# Patient Record
Sex: FEMALE | Race: OTHER | URBAN - METROPOLITAN AREA
[De-identification: names, ages, dates, MRNs, and addresses within clinical notes are randomized per-mention and may not be internally consistent; named-entity substitution may affect disease eponyms.]

---

## 2017-07-12 ENCOUNTER — EMERGENCY (EMERGENCY)
Facility: HOSPITAL | Age: 47
LOS: 1 days | Discharge: HOME | End: 2017-07-12
Attending: EMERGENCY MEDICINE
Payer: MEDICAID

## 2017-07-12 VITALS
SYSTOLIC BLOOD PRESSURE: 126 MMHG | RESPIRATION RATE: 16 BRPM | DIASTOLIC BLOOD PRESSURE: 83 MMHG | HEART RATE: 85 BPM | TEMPERATURE: 99 F | OXYGEN SATURATION: 96 %

## 2017-07-12 VITALS
DIASTOLIC BLOOD PRESSURE: 90 MMHG | HEART RATE: 100 BPM | HEIGHT: 61 IN | TEMPERATURE: 99 F | WEIGHT: 139.99 LBS | RESPIRATION RATE: 18 BRPM | SYSTOLIC BLOOD PRESSURE: 136 MMHG | OXYGEN SATURATION: 100 %

## 2017-07-12 DIAGNOSIS — R00.2 PALPITATIONS: ICD-10-CM

## 2017-07-12 DIAGNOSIS — L23.7 ALLERGIC CONTACT DERMATITIS DUE TO PLANTS, EXCEPT FOOD: ICD-10-CM

## 2017-07-12 DIAGNOSIS — R07.9 CHEST PAIN, UNSPECIFIED: ICD-10-CM

## 2017-07-12 LAB
ALBUMIN SERPL ELPH-MCNC: 3.6 G/DL — SIGNIFICANT CHANGE UP (ref 3.5–5)
ALP SERPL-CCNC: 110 U/L — SIGNIFICANT CHANGE UP (ref 40–120)
ALT FLD-CCNC: 35 U/L DA — SIGNIFICANT CHANGE UP (ref 10–60)
ANION GAP SERPL CALC-SCNC: 7 MMOL/L — SIGNIFICANT CHANGE UP (ref 5–17)
AST SERPL-CCNC: 28 U/L — SIGNIFICANT CHANGE UP (ref 10–40)
BILIRUB SERPL-MCNC: 0.3 MG/DL — SIGNIFICANT CHANGE UP (ref 0.2–1.2)
BUN SERPL-MCNC: 11 MG/DL — SIGNIFICANT CHANGE UP (ref 7–18)
CALCIUM SERPL-MCNC: 8.6 MG/DL — SIGNIFICANT CHANGE UP (ref 8.4–10.5)
CHLORIDE SERPL-SCNC: 108 MMOL/L — SIGNIFICANT CHANGE UP (ref 96–108)
CK MB BLD-MCNC: <0.9 % — SIGNIFICANT CHANGE UP (ref 0–3.5)
CK MB CFR SERPL CALC: <1 NG/ML — SIGNIFICANT CHANGE UP (ref 0–3.6)
CK SERPL-CCNC: 111 U/L — SIGNIFICANT CHANGE UP (ref 21–215)
CO2 SERPL-SCNC: 26 MMOL/L — SIGNIFICANT CHANGE UP (ref 22–31)
CREAT SERPL-MCNC: 0.9 MG/DL — SIGNIFICANT CHANGE UP (ref 0.5–1.3)
D DIMER BLD IA.RAPID-MCNC: 3286 NG/ML DDU — HIGH
GLUCOSE SERPL-MCNC: 97 MG/DL — SIGNIFICANT CHANGE UP (ref 70–99)
HCT VFR BLD CALC: 43.7 % — SIGNIFICANT CHANGE UP (ref 34.5–45)
HGB BLD-MCNC: 14.2 G/DL — SIGNIFICANT CHANGE UP (ref 11.5–15.5)
MCHC RBC-ENTMCNC: 30.3 PG — SIGNIFICANT CHANGE UP (ref 27–34)
MCHC RBC-ENTMCNC: 32.5 GM/DL — SIGNIFICANT CHANGE UP (ref 32–36)
MCV RBC AUTO: 93.4 FL — SIGNIFICANT CHANGE UP (ref 80–100)
PLATELET # BLD AUTO: 277 K/UL — SIGNIFICANT CHANGE UP (ref 150–400)
POTASSIUM SERPL-MCNC: 4.2 MMOL/L — SIGNIFICANT CHANGE UP (ref 3.5–5.3)
POTASSIUM SERPL-SCNC: 4.2 MMOL/L — SIGNIFICANT CHANGE UP (ref 3.5–5.3)
PROT SERPL-MCNC: 7.3 G/DL — SIGNIFICANT CHANGE UP (ref 6–8.3)
RBC # BLD: 4.67 M/UL — SIGNIFICANT CHANGE UP (ref 3.8–5.2)
RBC # FLD: 13.6 % — SIGNIFICANT CHANGE UP (ref 10.3–14.5)
SODIUM SERPL-SCNC: 141 MMOL/L — SIGNIFICANT CHANGE UP (ref 135–145)
TROPONIN I SERPL-MCNC: <0.015 NG/ML — SIGNIFICANT CHANGE UP (ref 0–0.04)
WBC # BLD: 14.3 K/UL — HIGH (ref 3.8–10.5)
WBC # FLD AUTO: 14.3 K/UL — HIGH (ref 3.8–10.5)

## 2017-07-12 PROCEDURE — 71020: CPT | Mod: 26

## 2017-07-12 PROCEDURE — 71275 CT ANGIOGRAPHY CHEST: CPT | Mod: 26

## 2017-07-12 PROCEDURE — 99285 EMERGENCY DEPT VISIT HI MDM: CPT

## 2017-07-12 RX ORDER — DIPHENHYDRAMINE HCL 50 MG
50 CAPSULE ORAL EVERY 4 HOURS
Qty: 0 | Refills: 0 | Status: DISCONTINUED | OUTPATIENT
Start: 2017-07-12 | End: 2017-07-16

## 2017-07-12 RX ORDER — DIPHENHYDRAMINE HCL 50 MG
2 CAPSULE ORAL
Qty: 40 | Refills: 0
Start: 2017-07-12 | End: 2017-07-17

## 2017-07-12 RX ADMIN — Medication 50 MILLIGRAM(S): at 22:01

## 2017-07-12 RX ADMIN — Medication 125 MILLIGRAM(S): at 22:01

## 2017-07-12 NOTE — ED PROVIDER NOTE - PMH
Cervical disc disorder with radiculopathy    Chronic leg pain    Fibroid    Fibromyalgia    Polyneuropathic pain

## 2017-07-12 NOTE — ED PROVIDER NOTE - MEDICAL DECISION MAKING DETAILS
45 y/o F pt presents with CP. Pt also c/o generalized rash. Plan for EKG, labs including troponin and D-dimer, CXR, will give Prednisone and Benadryl, give topical cream, and reassess.

## 2017-07-12 NOTE — ED ADULT TRIAGE NOTE - CHIEF COMPLAINT QUOTE
pt c/o chest pain x 1 day, denies sob, breathing normally, seems to be more concerned about generalized pruritic rash she's had x 2 days, states from poison ivy

## 2017-07-12 NOTE — ED PROVIDER NOTE - OBJECTIVE STATEMENT
47 y/o F pt with no significant PMHx and no significant PSHx presents to ED c/o CP x1 day. Pt reports having palpitations for several days, which worsened today into CP; pt additionally states sweating. Pt also reports whole-body rash x2 days. Pt states she recently went hiking and may have contacted poison ivy. Pt denies b/l leg swelling, fever, chills, cough, SOB, or any other complaints. Pt also denies recent travel, recent long car rides, taking oral contraceptives, taking estrogen, or having experienced similar CP in the past. NKDA.

## 2017-07-13 PROCEDURE — 84484 ASSAY OF TROPONIN QUANT: CPT

## 2017-07-13 PROCEDURE — 85027 COMPLETE CBC AUTOMATED: CPT

## 2017-07-13 PROCEDURE — 82553 CREATINE MB FRACTION: CPT

## 2017-07-13 PROCEDURE — 82550 ASSAY OF CK (CPK): CPT

## 2017-07-13 PROCEDURE — 85379 FIBRIN DEGRADATION QUANT: CPT

## 2017-07-13 PROCEDURE — 71046 X-RAY EXAM CHEST 2 VIEWS: CPT

## 2017-07-13 PROCEDURE — 80053 COMPREHEN METABOLIC PANEL: CPT

## 2017-07-13 PROCEDURE — 96374 THER/PROPH/DIAG INJ IV PUSH: CPT

## 2017-07-13 PROCEDURE — 93005 ELECTROCARDIOGRAM TRACING: CPT

## 2017-07-13 PROCEDURE — 96375 TX/PRO/DX INJ NEW DRUG ADDON: CPT

## 2017-07-13 PROCEDURE — 71275 CT ANGIOGRAPHY CHEST: CPT

## 2017-07-13 PROCEDURE — 84702 CHORIONIC GONADOTROPIN TEST: CPT

## 2017-07-13 PROCEDURE — 99284 EMERGENCY DEPT VISIT MOD MDM: CPT | Mod: 25

## 2017-07-13 RX ORDER — HYDROCORTISONE 1 %
1 OINTMENT (GRAM) TOPICAL ONCE
Qty: 0 | Refills: 0 | Status: COMPLETED | OUTPATIENT
Start: 2017-07-13 | End: 2017-07-13

## 2017-07-13 RX ADMIN — Medication 1 APPLICATION(S): at 00:53

## 2017-07-13 NOTE — ED ADULT NURSE NOTE - CHPI ED SYMPTOMS NEG
no syncope/no chills/no cough/no nausea/no fever/no vomiting/no dizziness/no diaphoresis/no shortness of breath/no back pain

## 2017-09-06 ENCOUNTER — EMERGENCY (EMERGENCY)
Facility: HOSPITAL | Age: 47
LOS: 1 days | Discharge: HOME | End: 2017-09-06
Attending: EMERGENCY MEDICINE
Payer: MEDICAID

## 2017-09-06 VITALS
DIASTOLIC BLOOD PRESSURE: 82 MMHG | WEIGHT: 139.99 LBS | HEIGHT: 62 IN | TEMPERATURE: 99 F | SYSTOLIC BLOOD PRESSURE: 117 MMHG | HEART RATE: 113 BPM | OXYGEN SATURATION: 97 % | RESPIRATION RATE: 18 BRPM

## 2017-09-06 VITALS
DIASTOLIC BLOOD PRESSURE: 85 MMHG | SYSTOLIC BLOOD PRESSURE: 117 MMHG | HEART RATE: 87 BPM | OXYGEN SATURATION: 97 % | RESPIRATION RATE: 18 BRPM | TEMPERATURE: 99 F

## 2017-09-06 DIAGNOSIS — M50.10 CERVICAL DISC DISORDER WITH RADICULOPATHY, UNSPECIFIED CERVICAL REGION: ICD-10-CM

## 2017-09-06 DIAGNOSIS — D25.9 LEIOMYOMA OF UTERUS, UNSPECIFIED: ICD-10-CM

## 2017-09-06 DIAGNOSIS — B34.9 VIRAL INFECTION, UNSPECIFIED: ICD-10-CM

## 2017-09-06 DIAGNOSIS — M79.2 NEURALGIA AND NEURITIS, UNSPECIFIED: ICD-10-CM

## 2017-09-06 DIAGNOSIS — R11.2 NAUSEA WITH VOMITING, UNSPECIFIED: ICD-10-CM

## 2017-09-06 DIAGNOSIS — F17.210 NICOTINE DEPENDENCE, CIGARETTES, UNCOMPLICATED: ICD-10-CM

## 2017-09-06 DIAGNOSIS — R42 DIZZINESS AND GIDDINESS: ICD-10-CM

## 2017-09-06 DIAGNOSIS — M79.7 FIBROMYALGIA: ICD-10-CM

## 2017-09-06 DIAGNOSIS — R19.7 DIARRHEA, UNSPECIFIED: ICD-10-CM

## 2017-09-06 LAB
ALBUMIN SERPL ELPH-MCNC: 3.3 G/DL — LOW (ref 3.5–5)
ALP SERPL-CCNC: 123 U/L — HIGH (ref 40–120)
ALT FLD-CCNC: 40 U/L DA — SIGNIFICANT CHANGE UP (ref 10–60)
ANION GAP SERPL CALC-SCNC: 8 MMOL/L — SIGNIFICANT CHANGE UP (ref 5–17)
AST SERPL-CCNC: 55 U/L — HIGH (ref 10–40)
BILIRUB SERPL-MCNC: 0.3 MG/DL — SIGNIFICANT CHANGE UP (ref 0.2–1.2)
BUN SERPL-MCNC: 8 MG/DL — SIGNIFICANT CHANGE UP (ref 7–18)
CALCIUM SERPL-MCNC: 8.7 MG/DL — SIGNIFICANT CHANGE UP (ref 8.4–10.5)
CHLORIDE SERPL-SCNC: 105 MMOL/L — SIGNIFICANT CHANGE UP (ref 96–108)
CO2 SERPL-SCNC: 24 MMOL/L — SIGNIFICANT CHANGE UP (ref 22–31)
CREAT SERPL-MCNC: 0.96 MG/DL — SIGNIFICANT CHANGE UP (ref 0.5–1.3)
GLUCOSE SERPL-MCNC: 131 MG/DL — HIGH (ref 70–99)
HCT VFR BLD CALC: 45.1 % — HIGH (ref 34.5–45)
HGB BLD-MCNC: 14.8 G/DL — SIGNIFICANT CHANGE UP (ref 11.5–15.5)
LACTATE SERPL-SCNC: 3.1 MMOL/L — HIGH (ref 0.7–2)
MCHC RBC-ENTMCNC: 29.2 PG — SIGNIFICANT CHANGE UP (ref 27–34)
MCHC RBC-ENTMCNC: 32.8 GM/DL — SIGNIFICANT CHANGE UP (ref 32–36)
MCV RBC AUTO: 89.1 FL — SIGNIFICANT CHANGE UP (ref 80–100)
PLATELET # BLD AUTO: 230 K/UL — SIGNIFICANT CHANGE UP (ref 150–400)
POTASSIUM SERPL-MCNC: 3.8 MMOL/L — SIGNIFICANT CHANGE UP (ref 3.5–5.3)
POTASSIUM SERPL-SCNC: 3.8 MMOL/L — SIGNIFICANT CHANGE UP (ref 3.5–5.3)
PROT SERPL-MCNC: 7.3 G/DL — SIGNIFICANT CHANGE UP (ref 6–8.3)
RAPID RVP RESULT: SIGNIFICANT CHANGE UP
RBC # BLD: 5.06 M/UL — SIGNIFICANT CHANGE UP (ref 3.8–5.2)
RBC # FLD: 13.3 % — SIGNIFICANT CHANGE UP (ref 10.3–14.5)
SODIUM SERPL-SCNC: 137 MMOL/L — SIGNIFICANT CHANGE UP (ref 135–145)
WBC # BLD: 9.3 K/UL — SIGNIFICANT CHANGE UP (ref 3.8–10.5)
WBC # FLD AUTO: 9.3 K/UL — SIGNIFICANT CHANGE UP (ref 3.8–10.5)

## 2017-09-06 PROCEDURE — 85027 COMPLETE CBC AUTOMATED: CPT

## 2017-09-06 PROCEDURE — 80053 COMPREHEN METABOLIC PANEL: CPT

## 2017-09-06 PROCEDURE — 99284 EMERGENCY DEPT VISIT MOD MDM: CPT

## 2017-09-06 PROCEDURE — 87581 M.PNEUMON DNA AMP PROBE: CPT

## 2017-09-06 PROCEDURE — 71020: CPT | Mod: 26

## 2017-09-06 PROCEDURE — 87040 BLOOD CULTURE FOR BACTERIA: CPT

## 2017-09-06 PROCEDURE — 71046 X-RAY EXAM CHEST 2 VIEWS: CPT

## 2017-09-06 PROCEDURE — 99284 EMERGENCY DEPT VISIT MOD MDM: CPT | Mod: 25

## 2017-09-06 PROCEDURE — 36415 COLL VENOUS BLD VENIPUNCTURE: CPT

## 2017-09-06 PROCEDURE — 96374 THER/PROPH/DIAG INJ IV PUSH: CPT

## 2017-09-06 PROCEDURE — 87633 RESP VIRUS 12-25 TARGETS: CPT

## 2017-09-06 PROCEDURE — 87486 CHLMYD PNEUM DNA AMP PROBE: CPT

## 2017-09-06 PROCEDURE — 83605 ASSAY OF LACTIC ACID: CPT

## 2017-09-06 PROCEDURE — 87798 DETECT AGENT NOS DNA AMP: CPT

## 2017-09-06 RX ORDER — KETOROLAC TROMETHAMINE 30 MG/ML
30 SYRINGE (ML) INJECTION ONCE
Qty: 0 | Refills: 0 | Status: DISCONTINUED | OUTPATIENT
Start: 2017-09-06 | End: 2017-09-06

## 2017-09-06 RX ORDER — SODIUM CHLORIDE 9 MG/ML
1000 INJECTION INTRAMUSCULAR; INTRAVENOUS; SUBCUTANEOUS ONCE
Qty: 0 | Refills: 0 | Status: COMPLETED | OUTPATIENT
Start: 2017-09-06 | End: 2017-09-06

## 2017-09-06 RX ADMIN — SODIUM CHLORIDE 4000 MILLILITER(S): 9 INJECTION INTRAMUSCULAR; INTRAVENOUS; SUBCUTANEOUS at 11:02

## 2017-09-06 RX ADMIN — Medication 30 MILLIGRAM(S): at 11:02

## 2017-09-06 RX ADMIN — Medication 30 MILLIGRAM(S): at 13:34

## 2017-09-06 RX ADMIN — Medication 1 TABLET(S): at 14:06

## 2017-09-06 NOTE — ED PROVIDER NOTE - MEDICAL DECISION MAKING DETAILS
labs and xr reassuring. sx improved after toradol and fluids. discussed anticipatory guidance. pt feels sinus pressure x 10 days, likely viral but will treat for sinusitis. clear lung sounds and no sob or lightheadedness with ambulation. pmd follow up and anticipatory guidance.

## 2017-09-06 NOTE — ED PROVIDER NOTE - OBJECTIVE STATEMENT
47 y/o F w/ PMHx of Bronchitis presents to the ED c/o worsening bronchitis Sx x1 week. Pt notes fever of 102 x3 days, nausea, vomiting, diarrhea and dizziness. Pt states she saw PMD last week and was given Levaquin 500 mg x7 days. Pt denies chest pain, SOB, dysuria, or any other complaints. NKDA. 47 y/o F w/ PMHx of Bronchitis presents to the ED c/o worsening bronchitis Sx x1 week. Pt notes chills x3 days, nausea, vomiting x 3 per day, loose stool x 2 per day,  dizziness. No abdominal pain. Pt states she saw PMD last week and was given Levaquin 500 mg x7 days. Pt denies chest pain, SOB, dysuria, or any other complaints. NKDA.

## 2017-09-11 LAB
CULTURE RESULTS: SIGNIFICANT CHANGE UP
CULTURE RESULTS: SIGNIFICANT CHANGE UP
SPECIMEN SOURCE: SIGNIFICANT CHANGE UP
SPECIMEN SOURCE: SIGNIFICANT CHANGE UP

## 2018-10-04 ENCOUNTER — EMERGENCY (EMERGENCY)
Facility: HOSPITAL | Age: 48
LOS: 1 days | Discharge: HOME | End: 2018-10-04
Attending: EMERGENCY MEDICINE
Payer: MEDICAID

## 2018-10-04 VITALS
DIASTOLIC BLOOD PRESSURE: 77 MMHG | RESPIRATION RATE: 18 BRPM | SYSTOLIC BLOOD PRESSURE: 152 MMHG | TEMPERATURE: 98 F | HEART RATE: 88 BPM | OXYGEN SATURATION: 99 %

## 2018-10-04 VITALS
OXYGEN SATURATION: 98 % | DIASTOLIC BLOOD PRESSURE: 74 MMHG | TEMPERATURE: 99 F | SYSTOLIC BLOOD PRESSURE: 146 MMHG | RESPIRATION RATE: 16 BRPM | HEART RATE: 85 BPM

## 2018-10-04 PROCEDURE — 99283 EMERGENCY DEPT VISIT LOW MDM: CPT | Mod: 25

## 2018-10-04 PROCEDURE — 71046 X-RAY EXAM CHEST 2 VIEWS: CPT

## 2018-10-04 PROCEDURE — 71046 X-RAY EXAM CHEST 2 VIEWS: CPT | Mod: 26

## 2018-10-04 PROCEDURE — 99284 EMERGENCY DEPT VISIT MOD MDM: CPT

## 2018-10-04 PROCEDURE — 94640 AIRWAY INHALATION TREATMENT: CPT

## 2018-10-04 RX ORDER — ALBUTEROL 90 UG/1
2.5 AEROSOL, METERED ORAL ONCE
Qty: 0 | Refills: 0 | Status: COMPLETED | OUTPATIENT
Start: 2018-10-04 | End: 2018-10-04

## 2018-10-04 RX ORDER — ALBUTEROL 90 UG/1
2 AEROSOL, METERED ORAL
Qty: 1 | Refills: 0 | OUTPATIENT
Start: 2018-10-04

## 2018-10-04 RX ORDER — AZITHROMYCIN 500 MG/1
1 TABLET, FILM COATED ORAL
Qty: 6 | Refills: 0
Start: 2018-10-04 | End: 2018-10-08

## 2018-10-04 RX ORDER — GUAIFENESIN/DEXTROMETHORPHAN 600MG-30MG
10 TABLET, EXTENDED RELEASE 12 HR ORAL
Qty: 200 | Refills: 0 | OUTPATIENT
Start: 2018-10-04

## 2018-10-04 RX ORDER — IBUPROFEN 200 MG
1 TABLET ORAL
Qty: 30 | Refills: 0
Start: 2018-10-04

## 2018-10-04 RX ADMIN — ALBUTEROL 2.5 MILLIGRAM(S): 90 AEROSOL, METERED ORAL at 18:56

## 2018-10-04 NOTE — ED PROVIDER NOTE - OBJECTIVE STATEMENT
patient with history bronchitis with cough for 5 days with progressively worsened mucus production. took cough syrup and day quill without relief. no sore throat no runny nose no N/V/D.

## 2018-10-04 NOTE — ED PROVIDER NOTE - MEDICAL DECISION MAKING DETAILS
patient with cough. home with patient with cough. xray normal. notes improvement with nebulizer. patient requested antibiotics given that she feels symptoms and amount of mucus have been getting worse.

## 2018-10-04 NOTE — ED ADULT NURSE NOTE - CHIEF COMPLAINT QUOTE
Called patient and left a voice message for lab work , that can be mailed to her home. cough and diff breathing for 4 days

## 2018-10-04 NOTE — ED ADULT NURSE NOTE - NSIMPLEMENTINTERV_GEN_ALL_ED
Implemented All Universal Safety Interventions:  [place] to call system. Call bell, personal items and telephone within reach. Instruct patient to call for assistance. Room bathroom lighting operational. Non-slip footwear when patient is off stretcher. Physically safe environment: no spills, clutter or unnecessary equipment. Stretcher in lowest position, wheels locked, appropriate side rails in place.

## 2018-10-05 PROBLEM — M79.7 FIBROMYALGIA: Chronic | Status: ACTIVE | Noted: 2017-07-13

## 2018-11-09 ENCOUNTER — EMERGENCY (EMERGENCY)
Facility: HOSPITAL | Age: 48
LOS: 1 days | Discharge: HOME | End: 2018-11-09
Attending: EMERGENCY MEDICINE
Payer: MEDICAID

## 2018-11-09 VITALS
DIASTOLIC BLOOD PRESSURE: 95 MMHG | TEMPERATURE: 99 F | HEIGHT: 62 IN | SYSTOLIC BLOOD PRESSURE: 155 MMHG | WEIGHT: 154.98 LBS | HEART RATE: 101 BPM | OXYGEN SATURATION: 97 % | RESPIRATION RATE: 20 BRPM

## 2018-11-09 LAB
ALBUMIN SERPL ELPH-MCNC: 3.9 G/DL — SIGNIFICANT CHANGE UP (ref 3.5–5)
ALP SERPL-CCNC: 94 U/L — SIGNIFICANT CHANGE UP (ref 40–120)
ALT FLD-CCNC: 43 U/L DA — SIGNIFICANT CHANGE UP (ref 10–60)
ANION GAP SERPL CALC-SCNC: 9 MMOL/L — SIGNIFICANT CHANGE UP (ref 5–17)
AST SERPL-CCNC: 29 U/L — SIGNIFICANT CHANGE UP (ref 10–40)
BASOPHILS # BLD AUTO: 0.2 K/UL — SIGNIFICANT CHANGE UP (ref 0–0.2)
BASOPHILS NFR BLD AUTO: 1.1 % — SIGNIFICANT CHANGE UP (ref 0–2)
BILIRUB SERPL-MCNC: 0.2 MG/DL — SIGNIFICANT CHANGE UP (ref 0.2–1.2)
BUN SERPL-MCNC: 24 MG/DL — HIGH (ref 7–18)
CALCIUM SERPL-MCNC: 8.8 MG/DL — SIGNIFICANT CHANGE UP (ref 8.4–10.5)
CHLORIDE SERPL-SCNC: 106 MMOL/L — SIGNIFICANT CHANGE UP (ref 96–108)
CO2 SERPL-SCNC: 25 MMOL/L — SIGNIFICANT CHANGE UP (ref 22–31)
CREAT SERPL-MCNC: 1.04 MG/DL — SIGNIFICANT CHANGE UP (ref 0.5–1.3)
D DIMER BLD IA.RAPID-MCNC: 169 NG/ML DDU — SIGNIFICANT CHANGE UP
EOSINOPHIL # BLD AUTO: 0.1 K/UL — SIGNIFICANT CHANGE UP (ref 0–0.5)
EOSINOPHIL NFR BLD AUTO: 0.6 % — SIGNIFICANT CHANGE UP (ref 0–6)
GLUCOSE SERPL-MCNC: 92 MG/DL — SIGNIFICANT CHANGE UP (ref 70–99)
HCG SERPL-ACNC: 7 MIU/ML — HIGH
HCT VFR BLD CALC: 40.7 % — SIGNIFICANT CHANGE UP (ref 34.5–45)
HGB BLD-MCNC: 13.3 G/DL — SIGNIFICANT CHANGE UP (ref 11.5–15.5)
LYMPHOCYTES # BLD AUTO: 24.6 % — SIGNIFICANT CHANGE UP (ref 13–44)
LYMPHOCYTES # BLD AUTO: 4.1 K/UL — HIGH (ref 1–3.3)
MCHC RBC-ENTMCNC: 28.6 PG — SIGNIFICANT CHANGE UP (ref 27–34)
MCHC RBC-ENTMCNC: 32.6 GM/DL — SIGNIFICANT CHANGE UP (ref 32–36)
MCV RBC AUTO: 87.7 FL — SIGNIFICANT CHANGE UP (ref 80–100)
MONOCYTES # BLD AUTO: 0.9 K/UL — SIGNIFICANT CHANGE UP (ref 0–0.9)
MONOCYTES NFR BLD AUTO: 5.5 % — SIGNIFICANT CHANGE UP (ref 2–14)
NEUTROPHILS # BLD AUTO: 11.4 K/UL — HIGH (ref 1.8–7.4)
NEUTROPHILS NFR BLD AUTO: 68.2 % — SIGNIFICANT CHANGE UP (ref 43–77)
PLATELET # BLD AUTO: 288 K/UL — SIGNIFICANT CHANGE UP (ref 150–400)
POTASSIUM SERPL-MCNC: 3.9 MMOL/L — SIGNIFICANT CHANGE UP (ref 3.5–5.3)
POTASSIUM SERPL-SCNC: 3.9 MMOL/L — SIGNIFICANT CHANGE UP (ref 3.5–5.3)
PROT SERPL-MCNC: 7.5 G/DL — SIGNIFICANT CHANGE UP (ref 6–8.3)
RBC # BLD: 4.64 M/UL — SIGNIFICANT CHANGE UP (ref 3.8–5.2)
RBC # FLD: 13.9 % — SIGNIFICANT CHANGE UP (ref 10.3–14.5)
SODIUM SERPL-SCNC: 140 MMOL/L — SIGNIFICANT CHANGE UP (ref 135–145)
TROPONIN I SERPL-MCNC: <0.015 NG/ML — SIGNIFICANT CHANGE UP (ref 0–0.04)
WBC # BLD: 16.8 K/UL — HIGH (ref 3.8–10.5)
WBC # FLD AUTO: 16.8 K/UL — HIGH (ref 3.8–10.5)

## 2018-11-09 PROCEDURE — 99285 EMERGENCY DEPT VISIT HI MDM: CPT | Mod: 25

## 2018-11-09 PROCEDURE — 71046 X-RAY EXAM CHEST 2 VIEWS: CPT | Mod: 26

## 2018-11-09 RX ORDER — KETOROLAC TROMETHAMINE 30 MG/ML
15 SYRINGE (ML) INJECTION ONCE
Qty: 0 | Refills: 0 | Status: DISCONTINUED | OUTPATIENT
Start: 2018-11-09 | End: 2018-11-09

## 2018-11-09 RX ADMIN — Medication 15 MILLIGRAM(S): at 23:44

## 2018-11-09 NOTE — ED PROVIDER NOTE - OBJECTIVE STATEMENT
46 y/o F patient with a significant PMHx of Cervical disc disorder with radiculopathy, Chronic leg pain, Fibroid, Fibromyalgia, Polyneuropathic pain and a significant PSHx presents to the ED with chest pain which started a few hrs PTA. Patient states her chest pain is localized in the central and left side and exacerbates with movement and palpation. Patient states her chest pain feels like muscle spasm and is associated with palpitations earlier tonight. Patient denies any other complaints. NKDA. 46 y/o F patient with a significant PMHx of Cervical disc disorder with radiculopathy, restless leg syndrome, fibromyalgia, polyneuropathic pain and a significant PSHx presents to the ED with chest pain which started a few hrs PTA. Patient states her chest pain is localized in the central and left side and exacerbates with movement and palpation. Patient states her chest pain feels like muscle spasm and is associated with palpitations earlier tonight. Patient denies any other complaints. NKDA.

## 2018-11-09 NOTE — ED PROVIDER NOTE - NS ED ROS FT
CONSTITUTIONAL: no fever, no chills   EYES: no visual changes, no eye pain   ENMT: no nasal congestion, no throat pain  CARDIOVASCULAR: +chest pain, no edema, +palpitations   RESPIRATORY: no shortness of breath, no cough   GASTROINTESTINAL: no abdominal pain, no nausea, no vomiting, no diarrhea, no constipation   GENITOURINARY: no dysuria, no frequency  MUSCULOSKELETAL: no joint pains, no myalgias, no back pain   SKIN: no rashes  NEUROLOGICAL: no weakness, no headache, no dizziness, no slurred speech, no syncope   PSYCHIATRIC: no known mental health illness   HEME/LYMPH: no lymphadenopathy      All other ROS negative except as per HPI

## 2018-11-09 NOTE — ED PROVIDER NOTE - PHYSICAL EXAMINATION
GENERAL: wells appearing, no acute distress, moderate painful distress   HEAD: atraumatic   EYES: EOMI, pink conjunctiva   ENT: moist oral mucosa   CARDIAC: RRR, no edema, distal pulses present, L sided chest wall tenderness with light palpation   RESPIRATORY: lungs CTAB, no increased work of breathing   GASTROINTESTINAL: no abdominal tenderness, no rebound or guarding, bowel sounds presents  GENITOURINARY: no CVA tenderness   MUSCULOSKELETAL: no deformity   NEUROLOGICAL: AAOx3, spontaneous movement of extremities   SKIN: intact   PSYCHIATRIC: cooperative  HEME LYMPH: no lymphadenopathy

## 2018-11-09 NOTE — ED PROVIDER NOTE - MEDICAL DECISION MAKING DETAILS
48 y/o F with reproducible chest pain, likely musculoskeletal. Check labs, EKG, chest X-ray and provide pain medication.

## 2018-11-09 NOTE — ED PROVIDER NOTE - PROGRESS NOTE DETAILS
EKG - nsr, rate 82, , QRS 82, QTc 439, LAD, no ischemic changes, no ectopy   CXR - no infiltrates, no effusions, no pneumothorax   labs - leukocytosis; D-dimer negative, troponin negative    Pt feeling a little after toradol, but asking for something stronger. Given morphine and now without pain. Pain unlikely cardiac. Will d/c with PCP fu. Discussed indications for patient return to ED. Patient understood.

## 2018-11-10 VITALS
OXYGEN SATURATION: 98 % | SYSTOLIC BLOOD PRESSURE: 128 MMHG | DIASTOLIC BLOOD PRESSURE: 78 MMHG | TEMPERATURE: 99 F | HEART RATE: 82 BPM | RESPIRATION RATE: 18 BRPM

## 2018-11-10 PROCEDURE — 71046 X-RAY EXAM CHEST 2 VIEWS: CPT

## 2018-11-10 PROCEDURE — 96374 THER/PROPH/DIAG INJ IV PUSH: CPT

## 2018-11-10 PROCEDURE — 99285 EMERGENCY DEPT VISIT HI MDM: CPT | Mod: 25

## 2018-11-10 PROCEDURE — 85379 FIBRIN DEGRADATION QUANT: CPT

## 2018-11-10 PROCEDURE — 93005 ELECTROCARDIOGRAM TRACING: CPT

## 2018-11-10 PROCEDURE — 84702 CHORIONIC GONADOTROPIN TEST: CPT

## 2018-11-10 PROCEDURE — 80053 COMPREHEN METABOLIC PANEL: CPT

## 2018-11-10 PROCEDURE — 85027 COMPLETE CBC AUTOMATED: CPT

## 2018-11-10 PROCEDURE — 84484 ASSAY OF TROPONIN QUANT: CPT

## 2018-11-10 PROCEDURE — 96375 TX/PRO/DX INJ NEW DRUG ADDON: CPT

## 2018-11-10 RX ORDER — MORPHINE SULFATE 50 MG/1
4 CAPSULE, EXTENDED RELEASE ORAL ONCE
Qty: 0 | Refills: 0 | Status: DISCONTINUED | OUTPATIENT
Start: 2018-11-10 | End: 2018-11-10

## 2018-11-10 RX ADMIN — MORPHINE SULFATE 4 MILLIGRAM(S): 50 CAPSULE, EXTENDED RELEASE ORAL at 00:58

## 2019-10-16 ENCOUNTER — EMERGENCY (EMERGENCY)
Facility: HOSPITAL | Age: 49
LOS: 1 days | Discharge: HOME | End: 2019-10-16
Attending: EMERGENCY MEDICINE
Payer: MEDICAID

## 2019-10-16 VITALS
WEIGHT: 145.06 LBS | OXYGEN SATURATION: 96 % | HEIGHT: 62 IN | DIASTOLIC BLOOD PRESSURE: 86 MMHG | TEMPERATURE: 100 F | RESPIRATION RATE: 18 BRPM | HEART RATE: 113 BPM | SYSTOLIC BLOOD PRESSURE: 129 MMHG

## 2019-10-16 VITALS
SYSTOLIC BLOOD PRESSURE: 110 MMHG | HEART RATE: 85 BPM | RESPIRATION RATE: 18 BRPM | OXYGEN SATURATION: 98 % | DIASTOLIC BLOOD PRESSURE: 62 MMHG

## 2019-10-16 LAB
ALBUMIN SERPL ELPH-MCNC: 4 G/DL — SIGNIFICANT CHANGE UP (ref 3.5–5)
ALP SERPL-CCNC: 130 U/L — HIGH (ref 40–120)
ALT FLD-CCNC: 59 U/L DA — SIGNIFICANT CHANGE UP (ref 10–60)
ANION GAP SERPL CALC-SCNC: 6 MMOL/L — SIGNIFICANT CHANGE UP (ref 5–17)
AST SERPL-CCNC: 51 U/L — HIGH (ref 10–40)
BASOPHILS # BLD AUTO: 0.05 K/UL — SIGNIFICANT CHANGE UP (ref 0–0.2)
BASOPHILS NFR BLD AUTO: 0.3 % — SIGNIFICANT CHANGE UP (ref 0–2)
BILIRUB SERPL-MCNC: 0.4 MG/DL — SIGNIFICANT CHANGE UP (ref 0.2–1.2)
BUN SERPL-MCNC: 11 MG/DL — SIGNIFICANT CHANGE UP (ref 7–18)
CALCIUM SERPL-MCNC: 9.4 MG/DL — SIGNIFICANT CHANGE UP (ref 8.4–10.5)
CHLORIDE SERPL-SCNC: 105 MMOL/L — SIGNIFICANT CHANGE UP (ref 96–108)
CO2 SERPL-SCNC: 26 MMOL/L — SIGNIFICANT CHANGE UP (ref 22–31)
CREAT SERPL-MCNC: 0.98 MG/DL — SIGNIFICANT CHANGE UP (ref 0.5–1.3)
EOSINOPHIL # BLD AUTO: 0.02 K/UL — SIGNIFICANT CHANGE UP (ref 0–0.5)
EOSINOPHIL NFR BLD AUTO: 0.1 % — SIGNIFICANT CHANGE UP (ref 0–6)
GLUCOSE SERPL-MCNC: 108 MG/DL — HIGH (ref 70–99)
HCG SERPL-ACNC: 2 MIU/ML — SIGNIFICANT CHANGE UP
HCT VFR BLD CALC: 44.3 % — SIGNIFICANT CHANGE UP (ref 34.5–45)
HGB BLD-MCNC: 14.4 G/DL — SIGNIFICANT CHANGE UP (ref 11.5–15.5)
IMM GRANULOCYTES NFR BLD AUTO: 0.3 % — SIGNIFICANT CHANGE UP (ref 0–1.5)
LIDOCAIN IGE QN: 37 U/L — LOW (ref 73–393)
LYMPHOCYTES # BLD AUTO: 1.79 K/UL — SIGNIFICANT CHANGE UP (ref 1–3.3)
LYMPHOCYTES # BLD AUTO: 10.8 % — LOW (ref 13–44)
MCHC RBC-ENTMCNC: 28.4 PG — SIGNIFICANT CHANGE UP (ref 27–34)
MCHC RBC-ENTMCNC: 32.5 GM/DL — SIGNIFICANT CHANGE UP (ref 32–36)
MCV RBC AUTO: 87.4 FL — SIGNIFICANT CHANGE UP (ref 80–100)
MONOCYTES # BLD AUTO: 0.88 K/UL — SIGNIFICANT CHANGE UP (ref 0–0.9)
MONOCYTES NFR BLD AUTO: 5.3 % — SIGNIFICANT CHANGE UP (ref 2–14)
NEUTROPHILS # BLD AUTO: 13.73 K/UL — HIGH (ref 1.8–7.4)
NEUTROPHILS NFR BLD AUTO: 83.2 % — HIGH (ref 43–77)
NRBC # BLD: 0 /100 WBCS — SIGNIFICANT CHANGE UP (ref 0–0)
PLATELET # BLD AUTO: 299 K/UL — SIGNIFICANT CHANGE UP (ref 150–400)
POTASSIUM SERPL-MCNC: 4.3 MMOL/L — SIGNIFICANT CHANGE UP (ref 3.5–5.3)
POTASSIUM SERPL-SCNC: 4.3 MMOL/L — SIGNIFICANT CHANGE UP (ref 3.5–5.3)
PROT SERPL-MCNC: 8.2 G/DL — SIGNIFICANT CHANGE UP (ref 6–8.3)
RBC # BLD: 5.07 M/UL — SIGNIFICANT CHANGE UP (ref 3.8–5.2)
RBC # FLD: 14.4 % — SIGNIFICANT CHANGE UP (ref 10.3–14.5)
SODIUM SERPL-SCNC: 137 MMOL/L — SIGNIFICANT CHANGE UP (ref 135–145)
WBC # BLD: 16.52 K/UL — HIGH (ref 3.8–10.5)
WBC # FLD AUTO: 16.52 K/UL — HIGH (ref 3.8–10.5)

## 2019-10-16 PROCEDURE — 99284 EMERGENCY DEPT VISIT MOD MDM: CPT

## 2019-10-16 PROCEDURE — 96361 HYDRATE IV INFUSION ADD-ON: CPT

## 2019-10-16 PROCEDURE — 36415 COLL VENOUS BLD VENIPUNCTURE: CPT

## 2019-10-16 PROCEDURE — 96374 THER/PROPH/DIAG INJ IV PUSH: CPT

## 2019-10-16 PROCEDURE — 96375 TX/PRO/DX INJ NEW DRUG ADDON: CPT

## 2019-10-16 PROCEDURE — 85027 COMPLETE CBC AUTOMATED: CPT

## 2019-10-16 PROCEDURE — 84702 CHORIONIC GONADOTROPIN TEST: CPT

## 2019-10-16 PROCEDURE — 80053 COMPREHEN METABOLIC PANEL: CPT

## 2019-10-16 PROCEDURE — 83690 ASSAY OF LIPASE: CPT

## 2019-10-16 PROCEDURE — 99284 EMERGENCY DEPT VISIT MOD MDM: CPT | Mod: 25

## 2019-10-16 RX ORDER — LORATADINE, PSEUDOEPHEDRINE SULFATE 5; 120 MG/1; MG/1
1 TABLET, FILM COATED, EXTENDED RELEASE ORAL
Qty: 20 | Refills: 0
Start: 2019-10-16

## 2019-10-16 RX ORDER — SODIUM CHLORIDE 9 MG/ML
1000 INJECTION INTRAMUSCULAR; INTRAVENOUS; SUBCUTANEOUS ONCE
Refills: 0 | Status: COMPLETED | OUTPATIENT
Start: 2019-10-16 | End: 2019-10-16

## 2019-10-16 RX ORDER — ONDANSETRON 8 MG/1
4 TABLET, FILM COATED ORAL ONCE
Refills: 0 | Status: COMPLETED | OUTPATIENT
Start: 2019-10-16 | End: 2019-10-16

## 2019-10-16 RX ORDER — IBUPROFEN 200 MG
600 TABLET ORAL ONCE
Refills: 0 | Status: COMPLETED | OUTPATIENT
Start: 2019-10-16 | End: 2019-10-16

## 2019-10-16 RX ORDER — CEFTRIAXONE 500 MG/1
1000 INJECTION, POWDER, FOR SOLUTION INTRAMUSCULAR; INTRAVENOUS ONCE
Refills: 0 | Status: COMPLETED | OUTPATIENT
Start: 2019-10-16 | End: 2019-10-16

## 2019-10-16 RX ORDER — FLUTICASONE PROPIONATE 50 MCG
1 SPRAY, SUSPENSION NASAL
Qty: 1 | Refills: 0
Start: 2019-10-16

## 2019-10-16 RX ORDER — AMOXICILLIN 250 MG/5ML
1 SUSPENSION, RECONSTITUTED, ORAL (ML) ORAL
Qty: 20 | Refills: 0
Start: 2019-10-16 | End: 2019-10-25

## 2019-10-16 RX ORDER — METHADONE HYDROCHLORIDE 40 MG/1
0 TABLET ORAL
Qty: 0 | Refills: 0 | DISCHARGE

## 2019-10-16 RX ORDER — SODIUM CHLORIDE 9 MG/ML
3 INJECTION INTRAMUSCULAR; INTRAVENOUS; SUBCUTANEOUS ONCE
Refills: 0 | Status: COMPLETED | OUTPATIENT
Start: 2019-10-16 | End: 2019-10-16

## 2019-10-16 RX ADMIN — ONDANSETRON 4 MILLIGRAM(S): 8 TABLET, FILM COATED ORAL at 05:29

## 2019-10-16 RX ADMIN — SODIUM CHLORIDE 3 MILLILITER(S): 9 INJECTION INTRAMUSCULAR; INTRAVENOUS; SUBCUTANEOUS at 05:29

## 2019-10-16 RX ADMIN — Medication 600 MILLIGRAM(S): at 06:25

## 2019-10-16 RX ADMIN — Medication 600 MILLIGRAM(S): at 05:28

## 2019-10-16 RX ADMIN — CEFTRIAXONE 100 MILLIGRAM(S): 500 INJECTION, POWDER, FOR SOLUTION INTRAMUSCULAR; INTRAVENOUS at 06:24

## 2019-10-16 RX ADMIN — SODIUM CHLORIDE 1000 MILLILITER(S): 9 INJECTION INTRAMUSCULAR; INTRAVENOUS; SUBCUTANEOUS at 05:29

## 2019-10-16 RX ADMIN — SODIUM CHLORIDE 1000 MILLILITER(S): 9 INJECTION INTRAMUSCULAR; INTRAVENOUS; SUBCUTANEOUS at 06:25

## 2019-10-16 RX ADMIN — CEFTRIAXONE 1000 MILLIGRAM(S): 500 INJECTION, POWDER, FOR SOLUTION INTRAMUSCULAR; INTRAVENOUS at 07:02

## 2019-10-16 NOTE — ED PROVIDER NOTE - CLINICAL SUMMARY MEDICAL DECISION MAKING FREE TEXT BOX
7:15a- Pt feels better. Pt has been symptomatic x 2 weeks. I will  Rx Flonase, Claritin D and Amox. Pt is well appearing walking with normal gait, stable for discharge and follow up with medical doctor. Pt educated on care and need for follow up. Discussed anticipatory guidance and return precautions. Questions answered. I had a detailed discussion with the patient and/or guardian regarding the historical points, exam findings, and any diagnostic results supporting the discharge diagnosis.

## 2019-10-16 NOTE — ED ADULT NURSE NOTE - NSIMPLEMENTINTERV_GEN_ALL_ED
Implemented All Universal Safety Interventions:  [place] to call system. Call bell, personal items and telephone within reach. Instruct patient to call for assistance. Room bathroom lighting operational. Non-slip footwear when patient is off stretcher. Physically safe environment: no spills, clutter or unnecessary equipment. Stretcher in lowest position, wheels locked, appropriate side rails in place.

## 2019-10-16 NOTE — ED PROVIDER NOTE - OBJECTIVE STATEMENT
47 y/o female with PMHx of fibromyalgia presents to the ED with c/o sinus congestion, nasal congestion, post nasal drip, and coughing with posttussive vomiting for 2 weeks. Pt reports that the posttussive vomiting has been becoming more frequent over the past 2 days. Pt denies any fever or other complaints.

## 2019-10-16 NOTE — ED PROVIDER NOTE - PATIENT PORTAL LINK FT
You can access the FollowMyHealth Patient Portal offered by Health system by registering at the following website: [email or web address]. By joining [facility]’s FollowMyHealth portal, you will also be able to view your health information using other applications (apps) compatible with our system.

## 2019-10-16 NOTE — ED PROVIDER NOTE - NSFOLLOWUPINSTRUCTIONS_ED_ALL_ED_FT
Return if pain, fever, vomiting, short of breath, any concerns.   See your doctor as soon as possible (within 1-2 days).   If you need further assistance for appointments you can contact the [facility] Care Coordinator at [phone] or the [facility] Patient Access Services helpline at [phone] to find names/contact #s for a practitioner to follow up with.  Bring your discharge papers / test results with you to any follow up appointments.   In addition our outpatient [facility] is located at [address] [place] [address], tel: [phone].

## 2019-10-16 NOTE — ED PROVIDER NOTE - NSFOLLOWUPCLINICS_GEN_ALL_ED_FT
[facility]  [facility] Office  [address] - [address] [place] [address]  Phone: [phone]  Fax: [phone]  Follow Up Time:

## 2021-01-25 NOTE — ED PROVIDER NOTE - NS ED MD DISPO DISCHARGE CCDA
Pt denies SI, SH, HI. Pt endorsed AVH of hearing \"loud banging\" and seeing \"darkness.\" Pt reports being \"very afraid\" and in the afternoon reported \"I fear violence\" to which writer clarified that the pt was afraid she would become violent in response to the AVH. Writer gave pt PRN Zyprexa Zydis x2, pt reported both doses were effective. Pt appeared preoccupied, guarded, and fearful. Pt's pulse has been elevated. Pt was visible in room under covers most of shift, did not want to leave room. Pt verbalized will notify staff of any safety concerns. Pt was compliant with medications. Continue to monitor pt per protocol.       Patient/Caregiver provided printed discharge information.

## 2023-04-23 NOTE — ED ADULT NURSE NOTE - EENT WDL
Eyes with no visual disturbances.  Ears clean and dry and no hearing difficulties. Nose with pink mucosa and no drainage.  Mouth mucous membranes moist and pink.  No tenderness or swelling to throat or neck.
Patent

## 2023-06-13 ENCOUNTER — INPATIENT (INPATIENT)
Facility: HOSPITAL | Age: 53
LOS: 1 days | Discharge: NURSING, REHAB OR LONG-TERM CARE | DRG: 283 | End: 2023-06-15
Attending: INTERNAL MEDICINE | Admitting: INTERNAL MEDICINE
Payer: MEDICAID

## 2023-06-13 VITALS
RESPIRATION RATE: 18 BRPM | OXYGEN SATURATION: 99 % | SYSTOLIC BLOOD PRESSURE: 115 MMHG | DIASTOLIC BLOOD PRESSURE: 62 MMHG | WEIGHT: 160.06 LBS | TEMPERATURE: 97 F | HEART RATE: 70 BPM | HEIGHT: 64 IN

## 2023-06-13 DIAGNOSIS — K71.6 TOXIC LIVER DISEASE WITH HEPATITIS, NOT ELSEWHERE CLASSIFIED: ICD-10-CM

## 2023-06-13 DIAGNOSIS — T36.8X5A ADVERSE EFFECT OF OTHER SYSTEMIC ANTIBIOTICS, INITIAL ENCOUNTER: ICD-10-CM

## 2023-06-13 DIAGNOSIS — Z79.891 LONG TERM (CURRENT) USE OF OPIATE ANALGESIC: ICD-10-CM

## 2023-06-13 DIAGNOSIS — Z88.1 ALLERGY STATUS TO OTHER ANTIBIOTIC AGENTS STATUS: ICD-10-CM

## 2023-06-13 DIAGNOSIS — M79.7 FIBROMYALGIA: ICD-10-CM

## 2023-06-13 DIAGNOSIS — R74.01 ELEVATION OF LEVELS OF LIVER TRANSAMINASE LEVELS: ICD-10-CM

## 2023-06-13 DIAGNOSIS — D64.9 ANEMIA, UNSPECIFIED: ICD-10-CM

## 2023-06-13 DIAGNOSIS — Z74.01 BED CONFINEMENT STATUS: ICD-10-CM

## 2023-06-13 DIAGNOSIS — R53.2 FUNCTIONAL QUADRIPLEGIA: ICD-10-CM

## 2023-06-13 DIAGNOSIS — F17.200 NICOTINE DEPENDENCE, UNSPECIFIED, UNCOMPLICATED: ICD-10-CM

## 2023-06-13 DIAGNOSIS — Z79.52 LONG TERM (CURRENT) USE OF SYSTEMIC STEROIDS: ICD-10-CM

## 2023-06-13 DIAGNOSIS — G61.0 GUILLAIN-BARRE SYNDROME: ICD-10-CM
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
## 2023-06-13 DIAGNOSIS — K80.20 CALCULUS OF GALLBLADDER WITHOUT CHOLECYSTITIS WITHOUT OBSTRUCTION: ICD-10-CM

## 2023-06-13 DIAGNOSIS — K71.10 TOXIC LIVER DISEASE WITH HEPATIC NECROSIS, WITHOUT COMA: ICD-10-CM

## 2023-06-13 LAB
ALBUMIN SERPL ELPH-MCNC: 3.9 G/DL — SIGNIFICANT CHANGE UP (ref 3.5–5.2)
ALP SERPL-CCNC: 794 U/L — HIGH (ref 30–115)
ALT FLD-CCNC: 268 U/L — HIGH (ref 0–41)
ANION GAP SERPL CALC-SCNC: 11 MMOL/L — SIGNIFICANT CHANGE UP (ref 7–14)
AST SERPL-CCNC: 164 U/L — HIGH (ref 0–41)
BASOPHILS # BLD AUTO: 0.04 K/UL — SIGNIFICANT CHANGE UP (ref 0–0.2)
BASOPHILS NFR BLD AUTO: 0.6 % — SIGNIFICANT CHANGE UP (ref 0–1)
BILIRUB DIRECT SERPL-MCNC: 0.2 MG/DL — SIGNIFICANT CHANGE UP (ref 0–0.3)
BILIRUB INDIRECT FLD-MCNC: 0.2 MG/DL — SIGNIFICANT CHANGE UP (ref 0.2–1.2)
BILIRUB SERPL-MCNC: 0.4 MG/DL — SIGNIFICANT CHANGE UP (ref 0.2–1.2)
BUN SERPL-MCNC: 10 MG/DL — SIGNIFICANT CHANGE UP (ref 10–20)
CALCIUM SERPL-MCNC: 9.7 MG/DL — SIGNIFICANT CHANGE UP (ref 8.4–10.4)
CHLORIDE SERPL-SCNC: 101 MMOL/L — SIGNIFICANT CHANGE UP (ref 98–110)
CO2 SERPL-SCNC: 27 MMOL/L — SIGNIFICANT CHANGE UP (ref 17–32)
CREAT SERPL-MCNC: <0.5 MG/DL — LOW (ref 0.7–1.5)
EGFR: 119 ML/MIN/1.73M2 — SIGNIFICANT CHANGE UP
EOSINOPHIL # BLD AUTO: 0.33 K/UL — SIGNIFICANT CHANGE UP (ref 0–0.7)
EOSINOPHIL NFR BLD AUTO: 4.5 % — SIGNIFICANT CHANGE UP (ref 0–8)
GLUCOSE SERPL-MCNC: 105 MG/DL — HIGH (ref 70–99)
HCT VFR BLD CALC: 36.9 % — LOW (ref 37–47)
HGB BLD-MCNC: 11.9 G/DL — LOW (ref 12–16)
IMM GRANULOCYTES NFR BLD AUTO: 0.3 % — SIGNIFICANT CHANGE UP (ref 0.1–0.3)
LACTATE SERPL-SCNC: 1.4 MMOL/L — SIGNIFICANT CHANGE UP (ref 0.7–2)
LIDOCAIN IGE QN: 11 U/L — SIGNIFICANT CHANGE UP (ref 7–60)
LYMPHOCYTES # BLD AUTO: 2.19 K/UL — SIGNIFICANT CHANGE UP (ref 1.2–3.4)
LYMPHOCYTES # BLD AUTO: 30.1 % — SIGNIFICANT CHANGE UP (ref 20.5–51.1)
MCHC RBC-ENTMCNC: 26.9 PG — LOW (ref 27–31)
MCHC RBC-ENTMCNC: 32.2 G/DL — SIGNIFICANT CHANGE UP (ref 32–37)
MCV RBC AUTO: 83.5 FL — SIGNIFICANT CHANGE UP (ref 81–99)
MONOCYTES # BLD AUTO: 0.67 K/UL — HIGH (ref 0.1–0.6)
MONOCYTES NFR BLD AUTO: 9.2 % — SIGNIFICANT CHANGE UP (ref 1.7–9.3)
NEUTROPHILS # BLD AUTO: 4.02 K/UL — SIGNIFICANT CHANGE UP (ref 1.4–6.5)
NEUTROPHILS NFR BLD AUTO: 55.3 % — SIGNIFICANT CHANGE UP (ref 42.2–75.2)
NRBC # BLD: 0 /100 WBCS — SIGNIFICANT CHANGE UP (ref 0–0)
PLATELET # BLD AUTO: 382 K/UL — SIGNIFICANT CHANGE UP (ref 130–400)
PMV BLD: 11.1 FL — HIGH (ref 7.4–10.4)
POTASSIUM SERPL-MCNC: 4.2 MMOL/L — SIGNIFICANT CHANGE UP (ref 3.5–5)
POTASSIUM SERPL-SCNC: 4.2 MMOL/L — SIGNIFICANT CHANGE UP (ref 3.5–5)
PROT SERPL-MCNC: 6.6 G/DL — SIGNIFICANT CHANGE UP (ref 6–8)
RBC # BLD: 4.42 M/UL — SIGNIFICANT CHANGE UP (ref 4.2–5.4)
RBC # FLD: 16.2 % — HIGH (ref 11.5–14.5)
SODIUM SERPL-SCNC: 139 MMOL/L — SIGNIFICANT CHANGE UP (ref 135–146)
WBC # BLD: 7.27 K/UL — SIGNIFICANT CHANGE UP (ref 4.8–10.8)
WBC # FLD AUTO: 7.27 K/UL — SIGNIFICANT CHANGE UP (ref 4.8–10.8)

## 2023-06-13 PROCEDURE — 82962 GLUCOSE BLOOD TEST: CPT

## 2023-06-13 PROCEDURE — 82248 BILIRUBIN DIRECT: CPT

## 2023-06-13 PROCEDURE — 99223 1ST HOSP IP/OBS HIGH 75: CPT

## 2023-06-13 PROCEDURE — 80053 COMPREHEN METABOLIC PANEL: CPT

## 2023-06-13 PROCEDURE — 74176 CT ABD & PELVIS W/O CONTRAST: CPT

## 2023-06-13 PROCEDURE — G0378: CPT

## 2023-06-13 PROCEDURE — 83540 ASSAY OF IRON: CPT

## 2023-06-13 PROCEDURE — 36415 COLL VENOUS BLD VENIPUNCTURE: CPT

## 2023-06-13 PROCEDURE — 84100 ASSAY OF PHOSPHORUS: CPT

## 2023-06-13 PROCEDURE — 99285 EMERGENCY DEPT VISIT HI MDM: CPT

## 2023-06-13 PROCEDURE — 86665 EPSTEIN-BARR CAPSID VCA: CPT

## 2023-06-13 PROCEDURE — 87517 HEPATITIS B DNA QUANT: CPT

## 2023-06-13 PROCEDURE — 85025 COMPLETE CBC W/AUTO DIFF WBC: CPT

## 2023-06-13 PROCEDURE — 86663 EPSTEIN-BARR ANTIBODY: CPT

## 2023-06-13 PROCEDURE — 85610 PROTHROMBIN TIME: CPT

## 2023-06-13 PROCEDURE — 83735 ASSAY OF MAGNESIUM: CPT

## 2023-06-13 PROCEDURE — 76705 ECHO EXAM OF ABDOMEN: CPT | Mod: 26

## 2023-06-13 PROCEDURE — 87521 HEPATITIS C PROBE&RVRS TRNSC: CPT

## 2023-06-13 PROCEDURE — 83550 IRON BINDING TEST: CPT

## 2023-06-13 PROCEDURE — 86376 MICROSOMAL ANTIBODY EACH: CPT

## 2023-06-13 PROCEDURE — 82977 ASSAY OF GGT: CPT

## 2023-06-13 PROCEDURE — 86664 EPSTEIN-BARR NUCLEAR ANTIGEN: CPT

## 2023-06-13 RX ORDER — ALPRAZOLAM 0.25 MG
0.25 TABLET ORAL ONCE
Refills: 0 | Status: DISCONTINUED | OUTPATIENT
Start: 2023-06-13 | End: 2023-06-13

## 2023-06-13 RX ORDER — LANOLIN ALCOHOL/MO/W.PET/CERES
3 CREAM (GRAM) TOPICAL AT BEDTIME
Refills: 0 | Status: DISCONTINUED | OUTPATIENT
Start: 2023-06-13 | End: 2023-06-15

## 2023-06-13 RX ORDER — SODIUM CHLORIDE 9 MG/ML
1000 INJECTION, SOLUTION INTRAVENOUS
Refills: 0 | Status: DISCONTINUED | OUTPATIENT
Start: 2023-06-13 | End: 2023-06-14

## 2023-06-13 RX ORDER — ONDANSETRON 8 MG/1
4 TABLET, FILM COATED ORAL EVERY 8 HOURS
Refills: 0 | Status: DISCONTINUED | OUTPATIENT
Start: 2023-06-13 | End: 2023-06-15

## 2023-06-13 RX ORDER — METHADONE HYDROCHLORIDE 40 MG/1
10 TABLET ORAL ONCE
Refills: 0 | Status: DISCONTINUED | OUTPATIENT
Start: 2023-06-13 | End: 2023-06-13

## 2023-06-13 RX ORDER — OXYCODONE HYDROCHLORIDE 5 MG/1
15 TABLET ORAL EVERY 6 HOURS
Refills: 0 | Status: DISCONTINUED | OUTPATIENT
Start: 2023-06-13 | End: 2023-06-14

## 2023-06-13 RX ORDER — ACETAMINOPHEN 500 MG
650 TABLET ORAL EVERY 6 HOURS
Refills: 0 | Status: DISCONTINUED | OUTPATIENT
Start: 2023-06-13 | End: 2023-06-15

## 2023-06-13 RX ADMIN — METHADONE HYDROCHLORIDE 10 MILLIGRAM(S): 40 TABLET ORAL at 21:21

## 2023-06-13 RX ADMIN — Medication 0.25 MILLIGRAM(S): at 15:44

## 2023-06-13 RX ADMIN — OXYCODONE HYDROCHLORIDE 15 MILLIGRAM(S): 5 TABLET ORAL at 17:03

## 2023-06-13 NOTE — H&P ADULT - HISTORY OF PRESENT ILLNESS
52-year-old female with past medical history of fibromyalgia, Guillain-Barré syndrome in October (patient parlyzed now bedbound had to be intubated - just had trach removed this week) presenting from [facility] for evaluation of elevated liver enzymes.  As per daughter patient was recently treated 2 weeks ago for a positive culture around trach site.  Patient is no longer on antibiotics.  Patient does not have any abdominal today.  No fevers or chills.  No nausea, vomiting or diarrhea.  · BP Systolic  115 mm Hg  · BP Diastolic  62 mm Hg  · Heart Rate  70 /min  · Respiration Rate (breaths/min)  18 /min  · Temp (F)  97.1 Degrees F  · Temp (C)  36.2 Degrees C  · Temp site  oral  · SpO2 (%)  99 %  · O2 Delivery/Oxygen Delivery Method  room air   52-year-old female with past medical history of fibromyalgia, Guillain-Barré syndrome in October (patient paralyzed now bedbound had to be intubated - just had trach removed this week) presenting from [facility] for evaluation of elevated liver enzymes.  According to the patient she was being treated in NH for culture positive from trach site for Stenotrophomonas, she was being treated for UTI and Pneumonia before trach culture turned positive. She is making significant improvement after the trach was removed. She is admitted for elevated liver enzymes and one episodes of RUQ abdominal pain, 8/10 in intensity, non radiating. She is not complaining of pain today.   ED vitals; Bp 115 mm Hg/62 mm Hg HR 70 /min RR 18 /min         52-year-old female with past medical history of fibromyalgia, Guillain-Barré syndrome in October (patient paralyzed now bedbound had to be intubated - just had trach removed this week) presenting from [facility] for evaluation of elevated liver enzymes.  According to the patient she was being treated in NH for culture positive from trach site for Stenotrophomonas, she was being treated for UTI and Pneumonia before trach culture turned positive. She is making significant improvement after the trach was removed. She is admitted for elevated liver enzymes and one episodes of RUQ abdominal pain, 8/10 in intensity, non radiating. She is not complaining of pain today.     ED vitals; Bp 115 mm Hg/62 mm Hg HR 70 /min RR 18 /min

## 2023-06-13 NOTE — H&P ADULT - NSHPREVIEWOFSYSTEMS_GEN_ALL_CORE
CONSTITUTIONAL: No weakness, fevers or chills  EYES/ENT: No visual changes;  No vertigo or throat pain   NECK: No pain or stiffness  RESPIRATORY: No cough, wheezing, hemoptysis; No shortness of breath  CARDIOVASCULAR: No chest pain or palpitations  GASTROINTESTINAL: No abdominal or epigastric pain. No nausea, vomiting, or hematemesis; No diarrhea or constipation. No melena or hematochezia.  GENITOURINARY: No dysuria, frequency or hematuria  NEUROLOGICAL: No numbness or weakness  SKIN: No itching, rashes CONSTITUTIONAL: No weakness, fevers or chills  EYES/ENT: No visual changes;  No vertigo or throat pain   NECK: No pain or stiffness  RESPIRATORY: No cough, wheezing, hemoptysis; No shortness of breath  CARDIOVASCULAR: No chest pain or palpitations  GASTROINTESTINAL: No abdominal or epigastric pain. No nausea, vomiting, or hematemesis; No diarrhea or constipation. No melena or hematochezia.  GENITOURINARY: No dysuria, frequency or hematuria  NEUROLOGICAL:  numbness and weakness in LE  SKIN: No itching, rashes

## 2023-06-13 NOTE — ED PROVIDER NOTE - CLINICAL SUMMARY MEDICAL DECISION MAKING FREE TEXT BOX
ed w/u w/ elevated lfts  sono showing stones but no cholecystitis or obvious choledocolithiasis (cbd 6mm)  gi consulted  will admit for further w/u

## 2023-06-13 NOTE — H&P ADULT - ATTENDING COMMENTS
Patient seen and examined at bedside independently of the residents. I read the resident's note and agree with the plan with the additions and corrections as noted below. My note supersedes the resident's note.     REVIEW OF SYSTEMS:  CONSTITUTIONAL: No weakness, fevers or chills  EYES/ENT: No visual changes;  No vertigo or throat pain   NECK: No pain or stiffness  RESPIRATORY: No cough, wheezing, hemoptysis; No shortness of breath  CARDIOVASCULAR: No chest pain or palpitations  GASTROINTESTINAL: No abdominal or epigastric pain. No nausea, vomiting, or hematemesis; No diarrhea or constipation. No melena or hematochezia.  GENITOURINARY: No dysuria, frequency or hematuria  NEUROLOGICAL: No numbness. + chronic weakness  MSK: No pain. + chronic weakness.   SKIN: No itching, rashes.     PMH: Fibromyalgia, GBS (bedbound)    FHx: Reviewed. No fhx of asthma/copd, No fhx of liver and pulmonary disease. No fhx of hematological disorder.     Physical Exam:  GEN: No acute distress. Awake, Alert and oriented x 3.   Head: Atraumatic, Normocephalic.   Eye: PEERLA. No sclera icterus. EOMI.   ENT: Normal oropharynx, no thyromegaly, no mass, no lymphadenopathy.   LUNGS: Clear to auscultation bilaterally. No wheeze/rales/crackles.   HEART: Normal. S1/S2 present. RRR. No murmur/gallops.   ABD: Soft, non-tender, non-distended. Bowel sounds present. + PEG tube.   EXT: No pitting edema. No erythema. No tenderness.  Integumentary: No rash, No sore, No petechia.   NEURO: CN III-XII intact. Strength: 2/5 b/l UE and 4/5 b/l LE. Sensory intact b/l ULE.     Vital Signs Last 24 Hrs  T(C): 36.5 (2023 00:41), Max: 36.5 (2023 00:41)  T(F): 97.7 (2023 00:41), Max: 97.7 (2023 00:41)  HR: 72 (2023 00:41) (70 - 72)  BP: 121/60 (2023 00:41) (115/62 - 121/60)  BP(mean): --  RR: 18 (2023 00:41) (18 - 18)  SpO2: 98% (2023 00:41) (98% - 99%)    Parameters below as of 2023 13:06  Patient On (Oxygen Delivery Method): room air        Please see the above notes for Labs and radiology.     Assessment and Plan:     51 yo F with hx of Fibromyalgia, GBS (bedbound), recent respiratory tract infection (s/p abx treatement, daughter does not know the name of abx) presents to ED from NH for elevated LFT.     Transaminitis   - RUQ shows Common bile duct measures 6 mm. Sludge and stones are present. There is no pericholecystic fluid or sonographic Scott sign.  - check hepatitis panel, CMV, EBV, anti-mitochondrial abs, anti-microsomal abs, anti-smooth muscle abs   - avoid hepatotoxic drugs.   - Hepatology consult.     Fibromyalgia - c/w home med  GBS (improving) - bedbound at baseline. follow up outpatient.     DVT ppx: Lovenox SC  GI ppx: PPI   Diet: DASH diet  Activity: as tolerated.     Date seen by the attendin2024  Total time spent: 75 minutes.

## 2023-06-13 NOTE — ED ADULT NURSE NOTE - CHIEF COMPLAINT QUOTE
BIBA from [facility] c.o right flank pain x 1 week. Denies any burning upon urination.

## 2023-06-13 NOTE — H&P ADULT - ASSESSMENT
52-year-old female with past medical history of fibromyalgia, Guillain-Barré syndrome in October (patient paralyzed now bedbound had to be intubated - just had trach removed this week) presenting from [facility] for evaluation of elevated liver enzymes.  According to the patient she was being treated in NH for culture positive from trach site for Stenotrophomonas, she was being treated for UTI and Pneumonia before trach culture turned positive. She is making significant improvement after the trach was removed. She is admitted for elevated liver enzymes and one episodes of RUQ abdominal pain, 8/10 in intensity, non radiating. She is not complaining of pain today.     # Cholelithiasis vs antibiotic induced liver injury  # Elevated liver enzymes  # Single episode of abdominal pain  # USG showed stones and sludge  # , ,   # Hx of GBS  # Asymptomatic, HD stable  - USG  Sludge and stones are present. There is no pericholecystic fluid or sonographic Scott sign.  - spoke with GI fellow he said to consult hepatology/ surgery  - trend LFTs  - f/u hepatology consult    # Hx of GBS  - on steroids and pain medications  - Morphine till 6/16/23  - Please confirm the medrec with the patient in AM as steroids were not on the list that i recieved    # Hx of Fibromyalgia  - On PRN pain medications    Diet: G -tube  Code: full  GI PPx: Protonix  activity as tolerated  PT consult         52-year-old female with past medical history of fibromyalgia, Guillain-Barré syndrome in October (patient paralyzed now bedbound had to be intubated - just had trach removed this week) presenting from [facility] for evaluation of elevated liver enzymes.  According to the patient she was being treated in NH for culture positive from trach site for Stenotrophomonas, she was being treated for UTI and Pneumonia before trach culture turned positive. She is making significant improvement after the trach was removed. She is admitted for elevated liver enzymes and one episodes of RUQ abdominal pain, 8/10 in intensity, non radiating. She is not complaining of pain today.     # Cholelithiasis vs antibiotic induced liver injury  # Elevated liver enzymes  # Single episode of abdominal pain  # USG showed stones and sludge  # , ,   # Hx of GBS  # Asymptomatic, HD stable  - USG  Sludge and stones are present. There is no pericholecystic fluid or sonographic Scott sign.  - spoke with GI fellow he said to consult hepatology/ surgery  - trend LFTs  - f/u hepatology consult  - F/U all the hepatic work up    # Hx of GBS  - on steroids and pain medications  - Morphine till 6/16/23  - Please confirm the medrec with the patient in AM as steroids were not on the list that i recieved    # Hx of Fibromyalgia  - On PRN pain medications    Diet: DASH  Code: full  GI PPx: Protonix  activity as tolerated  PT consult

## 2023-06-13 NOTE — ED PROVIDER NOTE - NS ED ATTENDING STATEMENT MOD
This was a shared visit with the GUILLERMO. I reviewed and verified the documentation and independently performed the documented:

## 2023-06-13 NOTE — H&P ADULT - NSHPPHYSICALEXAM_GEN_ALL_CORE
GENERAL: NAD, lying in bed comfortably  HEAD:  Atraumatic, Normocephalic  EYES: conjunctiva and sclera clear  ENT: Moist mucous membranes  NECK: Supple, No JVD  CHEST/LUNG: Clear to auscultation bilaterally; No rales, rhonchi, wheezing, or rubs. Unlabored respirations  HEART: Regular rate and rhythm; No murmurs, rubs, or gallops  ABDOMEN: Soft, nontender, nondistended  EXTREMITIES:  No clubbing, cyanosis, or edema  NERVOUS SYSTEM:  A&Ox3 GENERAL: NAD, lying in bed comfortably  HEAD:  Atraumatic, Normocephalic  EYES: conjunctiva and sclera clear  ENT: Moist mucous membranes  NECK: Supple, No JVD  CHEST/LUNG: Clear to auscultation bilaterally; No rales, rhonchi, wheezing, or rubs. Unlabored respirations  HEART: Regular rate and rhythm; No murmurs, rubs, or gallops  ABDOMEN: Soft, nontender, nondistended, negative reynolds sign  EXTREMITIES:  No clubbing, cyanosis, or edema  NERVOUS SYSTEM:  A&Ox3

## 2023-06-13 NOTE — ED PROVIDER NOTE - OBJECTIVE STATEMENT
52-year-old female with past medical history of fibromyalgia, Guillain-Barré syndrome in October (patient parlyzed now bedbound had to be intubated - just had trach removed this week) presenting from [facility] for evaluation of elevated liver enzymes.  As per daughter patient was recently treated 2 weeks ago for a positive culture around trach site.  Patient is no longer on antibiotics.  Patient does not have any abdominal today.  No fevers or chills.  No nausea, vomiting or diarrhea.

## 2023-06-13 NOTE — H&P ADULT - NSICDXPASTMEDICALHX_GEN_ALL_CORE_FT
PAST MEDICAL HISTORY:  Cervical disc disorder with radiculopathy     Chronic leg pain     Fibroid     Fibromyalgia     Polyneuropathic pain

## 2023-06-13 NOTE — H&P ADULT - NSHPLABSRESULTS_GEN_ALL_CORE
11.9   7.27  )-----------( 382      ( 13 Jun 2023 15:49 )             36.9     06-13    139  |  101  |  10  ----------------------------<  105<H>  4.2   |  27  |  <0.5<L>    Ca    9.7      13 Jun 2023 15:49    TPro  6.6  /  Alb  3.9  /  TBili  0.4  /  DBili  0.2  /  AST  164<H>  /  ALT  268<H>  /  AlkPhos  794<H>  06-13              Lactate Trend  06-13 @ 15:49 Lactate:1.4         CAPILLARY BLOOD GLUCOSE

## 2023-06-13 NOTE — ED PROVIDER NOTE - PHYSICAL EXAMINATION
VITAL SIGNS: I have reviewed nursing notes and confirm.  CONSTITUTIONAL: Patient is in no acute distress.  SKIN: Skin exam is warm and dry, no acute rash.  HEAD: Normocephalic; atraumatic.  EYES: PERRL, EOM intact; conjunctiva and sclera clear.  ENT: No nasal discharge; airway clear.   NECK: Supple; non tender.  CARD: S1, S2 normal; no murmurs, gallops, or rubs. Regular rate and rhythm.  RESP: Clear to auscultation bilaterally. No wheezes, rales or rhonchi.  ABD: Normal bowel sounds; soft; non-distended; non-tender.   EXT: Moving extremities.   LYMPH: No acute cervical adenopathy.  NEURO: Alert, oriented. No focal deficits.  PSYCH: Anxious, tearful.

## 2023-06-13 NOTE — ED PROVIDER NOTE - ATTENDING APP SHARED VISIT CONTRIBUTION OF CARE
52-year-old female with past medical history of fibromyalgia, Guillain-Barré syndrome in October (patient paralyzed now bedbound had to be intubated - just had trach removed this week) presenting from [facility] for evaluation of elevated liver enzymes and abn RUQ sono. LFTs have been trending up the past ~2 weeks. pt had sono end of may showing gallstones and sludge, no cholecystitis, cbd not visualized. pt sent in for further w/u. no fevers/chills/vomiting.     vss  gen- NAD, aaox3  card-rrr  lungs-ctab, no wheezing or rhonchi  abd-soft, mild RUQ tenderness, no guarding or rebound, PEG site clean, no tenderness   neuro- full str/sensation, cn ii-xii grossly intact, normal coordination

## 2023-06-13 NOTE — ED ADULT NURSE NOTE - NSFALLUNIVINTERV_ED_ALL_ED
Bed/Stretcher in lowest position, wheels locked, appropriate side rails in place/Call bell, personal items and telephone in reach/Instruct patient to call for assistance before getting out of bed/chair/stretcher/Non-slip footwear applied when patient is off stretcher/[place] to call system/Physically safe environment - no spills, clutter or unnecessary equipment/Purposeful proactive rounding/Room/bathroom lighting operational, light cord in reach

## 2023-06-14 LAB
ALBUMIN SERPL ELPH-MCNC: 3.4 G/DL — LOW (ref 3.5–5.2)
ALP SERPL-CCNC: 708 U/L — HIGH (ref 30–115)
ALT FLD-CCNC: 191 U/L — HIGH (ref 0–41)
ANION GAP SERPL CALC-SCNC: 14 MMOL/L — SIGNIFICANT CHANGE UP (ref 7–14)
AST SERPL-CCNC: 80 U/L — HIGH (ref 0–41)
BASOPHILS # BLD AUTO: 0.04 K/UL — SIGNIFICANT CHANGE UP (ref 0–0.2)
BASOPHILS NFR BLD AUTO: 0.5 % — SIGNIFICANT CHANGE UP (ref 0–1)
BILIRUB DIRECT SERPL-MCNC: <0.2 MG/DL — SIGNIFICANT CHANGE UP (ref 0–0.3)
BILIRUB INDIRECT FLD-MCNC: >0 MG/DL — LOW (ref 0.2–1.2)
BILIRUB SERPL-MCNC: 0.2 MG/DL — SIGNIFICANT CHANGE UP (ref 0.2–1.2)
BUN SERPL-MCNC: 9 MG/DL — LOW (ref 10–20)
CALCIUM SERPL-MCNC: 9.3 MG/DL — SIGNIFICANT CHANGE UP (ref 8.4–10.5)
CHLORIDE SERPL-SCNC: 103 MMOL/L — SIGNIFICANT CHANGE UP (ref 98–110)
CO2 SERPL-SCNC: 24 MMOL/L — SIGNIFICANT CHANGE UP (ref 17–32)
CREAT SERPL-MCNC: <0.5 MG/DL — LOW (ref 0.7–1.5)
EGFR: 119 ML/MIN/1.73M2 — SIGNIFICANT CHANGE UP
EOSINOPHIL # BLD AUTO: 0.31 K/UL — SIGNIFICANT CHANGE UP (ref 0–0.7)
EOSINOPHIL NFR BLD AUTO: 4.2 % — SIGNIFICANT CHANGE UP (ref 0–8)
GLUCOSE SERPL-MCNC: 91 MG/DL — SIGNIFICANT CHANGE UP (ref 70–99)
HBV DNA # SERPL NAA+PROBE: SIGNIFICANT CHANGE UP IU/ML
HBV DNA SERPL NAA+PROBE-LOG#: SIGNIFICANT CHANGE UP LOGIU/ML
HCT VFR BLD CALC: 32.4 % — LOW (ref 37–47)
HGB BLD-MCNC: 10.3 G/DL — LOW (ref 12–16)
IMM GRANULOCYTES NFR BLD AUTO: 0.1 % — SIGNIFICANT CHANGE UP (ref 0.1–0.3)
IRON SATN MFR SERPL: 26 % — SIGNIFICANT CHANGE UP (ref 15–50)
IRON SATN MFR SERPL: 59 UG/DL — SIGNIFICANT CHANGE UP (ref 35–150)
LYMPHOCYTES # BLD AUTO: 2.21 K/UL — SIGNIFICANT CHANGE UP (ref 1.2–3.4)
LYMPHOCYTES # BLD AUTO: 30.2 % — SIGNIFICANT CHANGE UP (ref 20.5–51.1)
MAGNESIUM SERPL-MCNC: 1.8 MG/DL — SIGNIFICANT CHANGE UP (ref 1.8–2.4)
MCHC RBC-ENTMCNC: 26.3 PG — LOW (ref 27–31)
MCHC RBC-ENTMCNC: 31.8 G/DL — LOW (ref 32–37)
MCV RBC AUTO: 82.9 FL — SIGNIFICANT CHANGE UP (ref 81–99)
MONOCYTES # BLD AUTO: 0.75 K/UL — HIGH (ref 0.1–0.6)
MONOCYTES NFR BLD AUTO: 10.2 % — HIGH (ref 1.7–9.3)
NEUTROPHILS # BLD AUTO: 4.01 K/UL — SIGNIFICANT CHANGE UP (ref 1.4–6.5)
NEUTROPHILS NFR BLD AUTO: 54.8 % — SIGNIFICANT CHANGE UP (ref 42.2–75.2)
NRBC # BLD: 0 /100 WBCS — SIGNIFICANT CHANGE UP (ref 0–0)
PLATELET # BLD AUTO: 324 K/UL — SIGNIFICANT CHANGE UP (ref 130–400)
PMV BLD: 11.1 FL — HIGH (ref 7.4–10.4)
POTASSIUM SERPL-MCNC: 4.3 MMOL/L — SIGNIFICANT CHANGE UP (ref 3.5–5)
POTASSIUM SERPL-SCNC: 4.3 MMOL/L — SIGNIFICANT CHANGE UP (ref 3.5–5)
PROT SERPL-MCNC: 5.9 G/DL — LOW (ref 6–8)
RBC # BLD: 3.91 M/UL — LOW (ref 4.2–5.4)
RBC # FLD: 16.4 % — HIGH (ref 11.5–14.5)
SODIUM SERPL-SCNC: 141 MMOL/L — SIGNIFICANT CHANGE UP (ref 135–146)
TIBC SERPL-MCNC: 226 UG/DL — SIGNIFICANT CHANGE UP (ref 220–430)
UIBC SERPL-MCNC: 167 UG/DL — SIGNIFICANT CHANGE UP (ref 110–370)
WBC # BLD: 7.33 K/UL — SIGNIFICANT CHANGE UP (ref 4.8–10.8)
WBC # FLD AUTO: 7.33 K/UL — SIGNIFICANT CHANGE UP (ref 4.8–10.8)

## 2023-06-14 PROCEDURE — 74176 CT ABD & PELVIS W/O CONTRAST: CPT | Mod: 26

## 2023-06-14 PROCEDURE — 99223 1ST HOSP IP/OBS HIGH 75: CPT

## 2023-06-14 PROCEDURE — 99232 SBSQ HOSP IP/OBS MODERATE 35: CPT

## 2023-06-14 RX ORDER — PANTOPRAZOLE SODIUM 20 MG/1
40 TABLET, DELAYED RELEASE ORAL
Refills: 0 | Status: DISCONTINUED | OUTPATIENT
Start: 2023-06-14 | End: 2023-06-15

## 2023-06-14 RX ORDER — GABAPENTIN 400 MG/1
600 CAPSULE ORAL THREE TIMES A DAY
Refills: 0 | Status: DISCONTINUED | OUTPATIENT
Start: 2023-06-14 | End: 2023-06-15

## 2023-06-14 RX ORDER — SIMETHICONE 80 MG/1
80 TABLET, CHEWABLE ORAL
Refills: 0 | Status: DISCONTINUED | OUTPATIENT
Start: 2023-06-14 | End: 2023-06-15

## 2023-06-14 RX ORDER — ALPRAZOLAM 0.25 MG
0.25 TABLET ORAL ONCE
Refills: 0 | Status: DISCONTINUED | OUTPATIENT
Start: 2023-06-14 | End: 2023-06-14

## 2023-06-14 RX ORDER — CELECOXIB 200 MG/1
200 CAPSULE ORAL DAILY
Refills: 0 | Status: DISCONTINUED | OUTPATIENT
Start: 2023-06-14 | End: 2023-06-14

## 2023-06-14 RX ORDER — CYCLOBENZAPRINE HYDROCHLORIDE 10 MG/1
5 TABLET, FILM COATED ORAL THREE TIMES A DAY
Refills: 0 | Status: DISCONTINUED | OUTPATIENT
Start: 2023-06-14 | End: 2023-06-15

## 2023-06-14 RX ORDER — PSEUDOEPHEDRINE HCL 30 MG
30 TABLET ORAL EVERY 6 HOURS
Refills: 0 | Status: DISCONTINUED | OUTPATIENT
Start: 2023-06-14 | End: 2023-06-15

## 2023-06-14 RX ORDER — URSODIOL 250 MG/1
250 TABLET, FILM COATED ORAL EVERY 12 HOURS
Refills: 0 | Status: DISCONTINUED | OUTPATIENT
Start: 2023-06-14 | End: 2023-06-15

## 2023-06-14 RX ORDER — METHADONE HYDROCHLORIDE 40 MG/1
10 TABLET ORAL
Refills: 0 | Status: DISCONTINUED | OUTPATIENT
Start: 2023-06-14 | End: 2023-06-15

## 2023-06-14 RX ORDER — CELECOXIB 200 MG/1
200 CAPSULE ORAL DAILY
Refills: 0 | Status: DISCONTINUED | OUTPATIENT
Start: 2023-06-14 | End: 2023-06-15

## 2023-06-14 RX ORDER — POLYETHYLENE GLYCOL 3350 17 G/17G
17 POWDER, FOR SOLUTION ORAL DAILY
Refills: 0 | Status: DISCONTINUED | OUTPATIENT
Start: 2023-06-14 | End: 2023-06-15

## 2023-06-14 RX ADMIN — SIMETHICONE 80 MILLIGRAM(S): 80 TABLET, CHEWABLE ORAL at 11:09

## 2023-06-14 RX ADMIN — CELECOXIB 200 MILLIGRAM(S): 200 CAPSULE ORAL at 11:09

## 2023-06-14 RX ADMIN — METHADONE HYDROCHLORIDE 10 MILLIGRAM(S): 40 TABLET ORAL at 05:34

## 2023-06-14 RX ADMIN — GABAPENTIN 600 MILLIGRAM(S): 400 CAPSULE ORAL at 21:01

## 2023-06-14 RX ADMIN — OXYCODONE HYDROCHLORIDE 15 MILLIGRAM(S): 5 TABLET ORAL at 00:17

## 2023-06-14 RX ADMIN — SIMETHICONE 80 MILLIGRAM(S): 80 TABLET, CHEWABLE ORAL at 07:01

## 2023-06-14 RX ADMIN — SIMETHICONE 80 MILLIGRAM(S): 80 TABLET, CHEWABLE ORAL at 17:39

## 2023-06-14 RX ADMIN — CELECOXIB 200 MILLIGRAM(S): 200 CAPSULE ORAL at 11:24

## 2023-06-14 RX ADMIN — Medication 0.25 MILLIGRAM(S): at 02:23

## 2023-06-14 RX ADMIN — PANTOPRAZOLE SODIUM 40 MILLIGRAM(S): 20 TABLET, DELAYED RELEASE ORAL at 08:09

## 2023-06-14 RX ADMIN — GABAPENTIN 600 MILLIGRAM(S): 400 CAPSULE ORAL at 13:21

## 2023-06-14 RX ADMIN — METHADONE HYDROCHLORIDE 10 MILLIGRAM(S): 40 TABLET ORAL at 17:39

## 2023-06-14 RX ADMIN — SODIUM CHLORIDE 75 MILLILITER(S): 9 INJECTION, SOLUTION INTRAVENOUS at 00:17

## 2023-06-14 RX ADMIN — GABAPENTIN 600 MILLIGRAM(S): 400 CAPSULE ORAL at 05:33

## 2023-06-14 RX ADMIN — URSODIOL 250 MILLIGRAM(S): 250 TABLET, FILM COATED ORAL at 18:31

## 2023-06-14 NOTE — CONSULT NOTE ADULT - REASON FOR ADMISSION
[facility] - Observation [address] [place] [address] Medicine  Discharge Summary      Patient Name: Gilda Agee  MRN: [id number]  MYA: [id number]  See Akins MD   Discharging Provider: See Akins MD  Primary Care Provider: Nicol Mahan MD  [facility] Medicine Team: [facility]OS[facility] MED  See Akins MD  Primary Care Team: [facility]    HPI:   Gilda Agee is a 75 y.o. female with a PMHx of chronic a fib on eliquis, HTN, HLD, CHF (EF 65%), and remote CVA with residual left-sided deficits and dysarthria who presents from [facility] [address] [facility] [facility] [address]  [facility] [address] [phone]                       secure chat preferred, or ext. [phone]  11/18/2022

## 2023-06-14 NOTE — CONSULT NOTE ADULT - SUBJECTIVE AND OBJECTIVE BOX
-------------------------------------------------------------------------------------------------------------------------------------------------------------------------------  HEPATOLOGY INITIAL CONSULT  -------------------------------------------------------------------------------------------------------------------------------------------------------------------------------    HPI:  52-year-old female with past medical history of fibromyalgia, Guillain-Barré syndrome in October (patient paralyzed now bedbound had to be intubated - just had trach removed this week) presenting from [facility] for evaluation of elevated liver enzymes.  According to the patient she was being treated in NH for culture positive from trach site for Stenotrophomonas, she was being treated for UTI and Pneumonia before trach culture turned positive. She is making significant improvement after the trach was removed. She is admitted for elevated liver enzymes and one episodes of RUQ abdominal pain, 8/10 in intensity, non radiating. She is not complaining of pain today.     ED vitals; Bp 115 mm Hg/62 mm Hg HR 70 /min RR 18 /min         (13 Jun 2023 21:48)      Outpatient GI Provider:    PAST MEDICAL & SURGICAL HISTORY:  Cervical disc disorder with radiculopathy      Polyneuropathic pain      Chronic leg pain      Fibroid      Fibromyalgia      No significant past surgical history          Review of Systems: Negative except as per HPI.    MEDICATIONS  (STANDING):  celecoxib 200 milliGRAM(s) Oral daily  gabapentin 600 milliGRAM(s) Oral three times a day  methadone    Tablet 10 milliGRAM(s) Oral two times a day  pantoprazole    Tablet 40 milliGRAM(s) Oral before breakfast  simethicone 80 milliGRAM(s) Chew four times a day    MEDICATIONS  (PRN):  acetaminophen     Tablet .. 650 milliGRAM(s) Oral every 6 hours PRN Temp greater or equal to 38C (100.4F), Mild Pain (1 - 3)  aluminum hydroxide/magnesium hydroxide/simethicone Suspension 30 milliLiter(s) Oral every 4 hours PRN Dyspepsia  cyclobenzaprine 5 milliGRAM(s) Oral three times a day PRN Muscle Spasm  melatonin 3 milliGRAM(s) Oral at bedtime PRN Insomnia  ondansetron Injectable 4 milliGRAM(s) IV Push every 8 hours PRN Nausea and/or Vomiting  oxycodone    5 mG/acetaminophen 325 mG 1 Tablet(s) Oral every 6 hours PRN Moderate Pain (4 - 6)  polyethylene glycol 3350 17 Gram(s) Oral daily PRN Constipation  pseudoephedrine 30 milliGRAM(s) Oral every 6 hours PRN N      ALLERGIES:      SOCIAL HISTORY:  - Alcohol:  - Recreational drug use:    FAMILY HISTORY:  No significant family history        Vital Signs Last 24 Hrs  T(C): 36.4 (14 Jun 2023 12:27), Max: 36.5 (14 Jun 2023 00:41)  T(F): 97.6 (14 Jun 2023 12:27), Max: 97.7 (14 Jun 2023 00:41)  HR: 71 (14 Jun 2023 12:27) (65 - 72)  BP: 107/62 (14 Jun 2023 12:27) (107/62 - 134/80)  BP(mean): --  RR: 18 (14 Jun 2023 12:27) (18 - 18)  SpO2: 97% (14 Jun 2023 12:27) (97% - 100%)    Parameters below as of 13 Jun 2023 13:06  Patient On (Oxygen Delivery Method): room air        PHYSICAL EXAM:  Constitutional: no acute distress  Eyes: no icterus  Neck: no masses, no LAD  Respiratory: normal inspiratory effort; no wheezing or crackles  Cardiovascular: RRR, normal S1/S2, no murmurs/rubs/gallops  Gastrointestinal: soft, nondistended, nontender, +BS  Rectal:   Extremities: no LE edema  Neurological: AAOx3, no asterixis  Skin: no rashes, bruises, petechiae    LABS:                        10.3   7.33  )-----------( 324      ( 14 Jun 2023 06:21 )             32.4     06-14    141  |  103  |  9<L>  ----------------------------<  91  4.3   |  24  |  <0.5<L>    Ca    9.3      14 Jun 2023 06:21  Mg     1.8     06-14    TPro  5.9<L>  /  Alb  3.4<L>  /  TBili  0.2  /  DBili  <0.2  /  AST  80<H>  /  ALT  191<H>  /  AlkPhos  708<H>  06-14      LIVER FUNCTIONS - ( 14 Jun 2023 06:21 )  Alb: 3.4 g/dL / Pro: 5.9 g/dL / ALK PHOS: 708 U/L / ALT: 191 U/L / AST: 80 U/L / GGT: x             RADIOLOGY & ADDITIONAL STUDIES: -------------------------------------------------------------------------------------------------------------------------------------------------------------------------------  HEPATOLOGY INITIAL CONSULT  -------------------------------------------------------------------------------------------------------------------------------------------------------------------------------    HPI:  52-year-old female with past medical history of fibromyalgia, Guillain-Barré syndrome in October (patient paralyzed now bedbound had to be intubated - just had trach removed this week) presenting from [facility] for evaluation of elevated liver enzymes.  According to the patient she was being treated in NH for culture positive from trach site for Stenotrophomonas, she was being treated for UTI and Pneumonia before trach culture turned positive. She is making significant improvement after the trach was removed. She is admitted for elevated liver enzymes She is not complaining of pain today. hepatology consulted for acute liver injury       PAST MEDICAL & SURGICAL HISTORY:  Cervical disc disorder with radiculopathy      Polyneuropathic pain      Chronic leg pain      Fibroid      Fibromyalgia      No significant past surgical history          Review of Systems: Negative except as per HPI.    MEDICATIONS  (STANDING):  celecoxib 200 milliGRAM(s) Oral daily  gabapentin 600 milliGRAM(s) Oral three times a day  methadone    Tablet 10 milliGRAM(s) Oral two times a day  pantoprazole    Tablet 40 milliGRAM(s) Oral before breakfast  simethicone 80 milliGRAM(s) Chew four times a day    MEDICATIONS  (PRN):  acetaminophen     Tablet .. 650 milliGRAM(s) Oral every 6 hours PRN Temp greater or equal to 38C (100.4F), Mild Pain (1 - 3)  aluminum hydroxide/magnesium hydroxide/simethicone Suspension 30 milliLiter(s) Oral every 4 hours PRN Dyspepsia  cyclobenzaprine 5 milliGRAM(s) Oral three times a day PRN Muscle Spasm  melatonin 3 milliGRAM(s) Oral at bedtime PRN Insomnia  ondansetron Injectable 4 milliGRAM(s) IV Push every 8 hours PRN Nausea and/or Vomiting  oxycodone    5 mG/acetaminophen 325 mG 1 Tablet(s) Oral every 6 hours PRN Moderate Pain (4 - 6)  polyethylene glycol 3350 17 Gram(s) Oral daily PRN Constipation  pseudoephedrine 30 milliGRAM(s) Oral every 6 hours PRN N      ALLERGIES:  nkda    SOCIAL HISTORY:  - Alcohol: none  - Recreational drug use: none     FAMILY HISTORY:  No significant family history        Vital Signs Last 24 Hrs  T(C): 36.4 (14 Jun 2023 12:27), Max: 36.5 (14 Jun 2023 00:41)  T(F): 97.6 (14 Jun 2023 12:27), Max: 97.7 (14 Jun 2023 00:41)  HR: 71 (14 Jun 2023 12:27) (65 - 72)  BP: 107/62 (14 Jun 2023 12:27) (107/62 - 134/80)  BP(mean): --  RR: 18 (14 Jun 2023 12:27) (18 - 18)  SpO2: 97% (14 Jun 2023 12:27) (97% - 100%)    Parameters below as of 13 Jun 2023 13:06  Patient On (Oxygen Delivery Method): room air        PHYSICAL EXAM:  Constitutional: no acute distress  Eyes: no icterus  Neck: no masses, no LAD  Respiratory: normal inspiratory effort; no wheezing or crackles  Cardiovascular: RRR, normal S1/S2, no murmurs/rubs/gallops  Gastrointestinal: soft, nondistended, nontender, +BS  Extremities: no LE edema  Neurological: AAOx3,   Skin: no rashes, bruises, petechiae    LABS:                        10.3   7.33  )-----------( 324      ( 14 Jun 2023 06:21 )             32.4     06-14    141  |  103  |  9<L>  ----------------------------<  91  4.3   |  24  |  <0.5<L>    Ca    9.3      14 Jun 2023 06:21  Mg     1.8     06-14    TPro  5.9<L>  /  Alb  3.4<L>  /  TBili  0.2  /  DBili  <0.2  /  AST  80<H>  /  ALT  191<H>  /  AlkPhos  708<H>  06-14      LIVER FUNCTIONS - ( 14 Jun 2023 06:21 )  Alb: 3.4 g/dL / Pro: 5.9 g/dL / ALK PHOS: 708 U/L / ALT: 191 U/L / AST: 80 U/L / GGT: x             RADIOLOGY & ADDITIONAL STUDIES:    < from: CT Abdomen and Pelvis No Cont (06.14.23 @ 11:10) >      FINDINGS: Evaluation of intra-abdominal organs is limited due to lack of   intravenous contrast.    LOWER CHEST: Bilateral pleural effusions and bibasilar atelectasis.    HEPATOBILIARY: Hydropic gallbladder with surrounding inflammatory changes   suspicious for acute cholecystitis.    SPLEEN: Unremarkable.    PANCREAS: Atrophic    ADRENAL GLANDS: Unremarkable.    KIDNEYS: Nonobstructing left renal calculi. No hydronephrosis. Left renal   hypodensity to small to characterize.    ABDOMINOPELVIC NODES: Unremarkable    PELVIC ORGANS: Unremarkable    PERITONEUM/MESENTERY/BOWEL:  Gastrostomy tube in stomach. No evidence of   bowel obstruction free air or fluid collection.    BONES/SOFT TISSUES: Degenerative changes of the spine.    OTHER: Atherosclerotic calcifications      IMPRESSION:    Hydropic gallbladder with surrounding inflammatory changes suspicious for   acute cholecystitis.    < end of copied text >  < from: US Abdomen Upper Quadrant Right (06.13.23 @ 16:46) >  IMPRESSION:    Sludge and stones are present within the gallbladder    --- End of Report ---    < end of copied text >

## 2023-06-14 NOTE — CONSULT NOTE ADULT - ASSESSMENT
52-year-old female with past medical history of fibromyalgia, Guillain-Barré syndrome in October (patient paralyzed now bedbound had to be intubated - just had trach removed this week) presenting from [facility] for evaluation of elevated liver enzymes.  52-year-old female with past medical history of fibromyalgia, Guillain-Barré syndrome in October (patient paralyzed now bedbound had to be intubated - just had trach removed this week) presenting from [facility] for evaluation of elevated liver enzymes.     # Acute liver injury Likely DILI  - Patient received multiple Antibiotics recently  - Started on Bactrim for Stenotrophomonas. Received cefepime and Meropenem before.   - CT and RUQUS noted   - LIVER FUNCTIONS - ( 14 Jun 2023 06:21 )  Alb: 3.4 g/dL / Pro: 5.9 g/dL / ALK PHOS: 708 U/L / ALT: 191 U/L / AST: 80 U/L / GGT: x           PLAN:   Trend LFTs  Daily INR   Can continue Bactrim for now   Ursodiol 250 BID   Avoid Hepatotoxic agents   Will follow

## 2023-06-14 NOTE — CHART NOTE - NSCHARTNOTEFT_GEN_A_CORE
Checked ISTOP and pt is getting oxycodone 5mg about 2-4 tablets per day and methadone 10mg bid. Last prescription of xanax was in 3/2023    A	Y	O	05/31/2023	06/01/2023	oxycodone hcl (ir) 5 mg tablet	56	14	Lam Taylor DO	  A	Y	O	05/30/2023	05/31/2023	methadone hcl 10 mg tablet	28	14	Oscar Bowen	N	O	05/17/2023	05/17/2023	methadone hcl 10 mg tablet	28	14	Lam Taylor DO	  A	N	O	05/16/2023	05/16/2023	oxycodone hcl (ir) 5 mg tablet	40	14	Oscar Bowen	N	O	05/03/2023	05/03/2023	methadone hcl 10 mg tablet	28	14	Lam Taylor DO	  A	N	O	05/03/2023	05/03/2023	oxycodone hcl (ir) 5 mg tablet	56	14	  A	N	O	04/22/2023	04/22/2023	methadone hcl 10 mg tablet	25	12	  A	N	O	04/19/2023	04/19/2023	oxycodone hcl (ir) 5 mg tablet	50	12	  A	N	O	04/17/2023	04/17/2023	methadone hcl 10 mg tablet	13	6	  A	N	O	04/10/2023	04/12/2023	oxycodone hcl (ir) 5 mg tablet	30	  A	N	O	04/03/2023	04/04/2023	methadone hcl 10 mg tablet	13	6	  A	N	O	03/29/2023	03/29/2023	oxycodone hcl (ir) 5 mg tablet	50	  A	N	O	03/24/2023	03/24/2023	methadone hcl 10 mg tablet	28	14	  A	N	B	03/20/2023	03/21/2023	alprazolam 0.25 mg tablet	28	14	  A	N	O	03/13/2023	03/13/2023	methadone hcl 10 mg tablet	7	14	  A	N	O	03/13/2023	03/13/2023	oxycodone hcl (ir) 5 mg tablet	45	  A	N	O	03/01/2023	03/01/2023	methadone hcl 10 mg tablet	28	14	  A	N	O	02/21/2023	02/21/2023	methadone hcl 10 mg tablet	28	14	  A	N	B	02/20/2023	02/21/2023	alprazolam 0.25 mg tablet	56	14

## 2023-06-14 NOTE — CONSULT NOTE ADULT - ATTENDING COMMENTS
52 y o  F with GBS seen for elevated liver tests. Developed GBS October and at rehab. Developed fever treated for pneumonia and found to have Stenotrophomonas infection and has been on Bactrim.  No abdominal pain  Alert oriented x 3  No icterus  Abdomen soft non tender  WBC plt normal mild normocytic anemia   ALP 700s T bili normal normal iron studies   CT -?  hydrops GB US cholelithiasis   Has cholestatic liver injury due to DILI likely Bactrim. No symptoms to suggest biliary issues.  Work up for cholestatic liver disease  Ursodiol 5 - 10 mg / kg per day  Can continue Bactrim   Monitor liver tests and INR

## 2023-06-15 ENCOUNTER — TRANSCRIPTION ENCOUNTER (OUTPATIENT)
Age: 53
End: 2023-06-15

## 2023-06-15 VITALS
DIASTOLIC BLOOD PRESSURE: 77 MMHG | RESPIRATION RATE: 18 BRPM | TEMPERATURE: 98 F | HEART RATE: 81 BPM | OXYGEN SATURATION: 96 % | SYSTOLIC BLOOD PRESSURE: 149 MMHG

## 2023-06-15 LAB
ALBUMIN SERPL ELPH-MCNC: 3.5 G/DL — SIGNIFICANT CHANGE UP (ref 3.5–5.2)
ALP SERPL-CCNC: 590 U/L — HIGH (ref 30–115)
ALT FLD-CCNC: 132 U/L — HIGH (ref 0–41)
ANION GAP SERPL CALC-SCNC: 13 MMOL/L — SIGNIFICANT CHANGE UP (ref 7–14)
AST SERPL-CCNC: 39 U/L — SIGNIFICANT CHANGE UP (ref 0–41)
BASOPHILS # BLD AUTO: 0.04 K/UL — SIGNIFICANT CHANGE UP (ref 0–0.2)
BASOPHILS NFR BLD AUTO: 0.6 % — SIGNIFICANT CHANGE UP (ref 0–1)
BILIRUB DIRECT SERPL-MCNC: <0.2 MG/DL — SIGNIFICANT CHANGE UP (ref 0–0.3)
BILIRUB INDIRECT FLD-MCNC: >0 MG/DL — LOW (ref 0.2–1.2)
BILIRUB SERPL-MCNC: 0.2 MG/DL — SIGNIFICANT CHANGE UP (ref 0.2–1.2)
BUN SERPL-MCNC: 11 MG/DL — SIGNIFICANT CHANGE UP (ref 10–20)
CALCIUM SERPL-MCNC: 9.6 MG/DL — SIGNIFICANT CHANGE UP (ref 8.4–10.5)
CHLORIDE SERPL-SCNC: 106 MMOL/L — SIGNIFICANT CHANGE UP (ref 98–110)
CO2 SERPL-SCNC: 24 MMOL/L — SIGNIFICANT CHANGE UP (ref 17–32)
CREAT SERPL-MCNC: <0.5 MG/DL — LOW (ref 0.7–1.5)
EBV EA AB SER IA-ACNC: <5 U/ML — SIGNIFICANT CHANGE UP
EBV EA AB TITR SER IF: POSITIVE
EBV EA IGG SER-ACNC: NEGATIVE — SIGNIFICANT CHANGE UP
EBV NA IGG SER IA-ACNC: 37.6 U/ML — HIGH
EBV PATRN SPEC IB-IMP: SIGNIFICANT CHANGE UP
EBV VCA IGG AVIDITY SER QL IA: POSITIVE
EBV VCA IGM SER IA-ACNC: <10 U/ML — SIGNIFICANT CHANGE UP
EBV VCA IGM SER IA-ACNC: >750 U/ML — HIGH
EBV VCA IGM TITR FLD: NEGATIVE — SIGNIFICANT CHANGE UP
EGFR: 119 ML/MIN/1.73M2 — SIGNIFICANT CHANGE UP
EOSINOPHIL # BLD AUTO: 0.29 K/UL — SIGNIFICANT CHANGE UP (ref 0–0.7)
EOSINOPHIL NFR BLD AUTO: 4.3 % — SIGNIFICANT CHANGE UP (ref 0–8)
GGT SERPL-CCNC: 1052 U/L — HIGH (ref 1–40)
GLUCOSE BLDC GLUCOMTR-MCNC: 112 MG/DL — HIGH (ref 70–99)
GLUCOSE SERPL-MCNC: 83 MG/DL — SIGNIFICANT CHANGE UP (ref 70–99)
HCT VFR BLD CALC: 34.8 % — LOW (ref 37–47)
HCV RNA FLD QL NAA+PROBE: SIGNIFICANT CHANGE UP
HCV RNA SPEC QL PROBE+SIG AMP: SIGNIFICANT CHANGE UP
HGB BLD-MCNC: 11 G/DL — LOW (ref 12–16)
IMM GRANULOCYTES NFR BLD AUTO: 0.3 % — SIGNIFICANT CHANGE UP (ref 0.1–0.3)
INR BLD: 0.95 RATIO — SIGNIFICANT CHANGE UP (ref 0.65–1.3)
LKM AB SER-ACNC: <20.1 UNITS — SIGNIFICANT CHANGE UP (ref 0–20)
LYMPHOCYTES # BLD AUTO: 2.33 K/UL — SIGNIFICANT CHANGE UP (ref 1.2–3.4)
LYMPHOCYTES # BLD AUTO: 34.5 % — SIGNIFICANT CHANGE UP (ref 20.5–51.1)
MAGNESIUM SERPL-MCNC: 1.9 MG/DL — SIGNIFICANT CHANGE UP (ref 1.8–2.4)
MCHC RBC-ENTMCNC: 26.6 PG — LOW (ref 27–31)
MCHC RBC-ENTMCNC: 31.6 G/DL — LOW (ref 32–37)
MCV RBC AUTO: 84.1 FL — SIGNIFICANT CHANGE UP (ref 81–99)
MONOCYTES # BLD AUTO: 0.72 K/UL — HIGH (ref 0.1–0.6)
MONOCYTES NFR BLD AUTO: 10.7 % — HIGH (ref 1.7–9.3)
NEUTROPHILS # BLD AUTO: 3.35 K/UL — SIGNIFICANT CHANGE UP (ref 1.4–6.5)
NEUTROPHILS NFR BLD AUTO: 49.6 % — SIGNIFICANT CHANGE UP (ref 42.2–75.2)
NRBC # BLD: 0 /100 WBCS — SIGNIFICANT CHANGE UP (ref 0–0)
PHOSPHATE SERPL-MCNC: 4.9 MG/DL — SIGNIFICANT CHANGE UP (ref 2.1–4.9)
PLATELET # BLD AUTO: 288 K/UL — SIGNIFICANT CHANGE UP (ref 130–400)
PMV BLD: 10.9 FL — HIGH (ref 7.4–10.4)
POTASSIUM SERPL-MCNC: 4.3 MMOL/L — SIGNIFICANT CHANGE UP (ref 3.5–5)
POTASSIUM SERPL-SCNC: 4.3 MMOL/L — SIGNIFICANT CHANGE UP (ref 3.5–5)
PROT SERPL-MCNC: 5.8 G/DL — LOW (ref 6–8)
PROTHROM AB SERPL-ACNC: 10.8 SEC — SIGNIFICANT CHANGE UP (ref 9.95–12.87)
RBC # BLD: 4.14 M/UL — LOW (ref 4.2–5.4)
RBC # FLD: 16.4 % — HIGH (ref 11.5–14.5)
SODIUM SERPL-SCNC: 143 MMOL/L — SIGNIFICANT CHANGE UP (ref 135–146)
WBC # BLD: 6.75 K/UL — SIGNIFICANT CHANGE UP (ref 4.8–10.8)
WBC # FLD AUTO: 6.75 K/UL — SIGNIFICANT CHANGE UP (ref 4.8–10.8)

## 2023-06-15 PROCEDURE — 99239 HOSP IP/OBS DSCHRG MGMT >30: CPT

## 2023-06-15 PROCEDURE — 99233 SBSQ HOSP IP/OBS HIGH 50: CPT

## 2023-06-15 RX ORDER — ONDANSETRON 8 MG/1
1 TABLET, FILM COATED ORAL
Qty: 90 | Refills: 0
Start: 2023-06-15 | End: 2023-07-14

## 2023-06-15 RX ORDER — URSODIOL 250 MG/1
1 TABLET, FILM COATED ORAL
Qty: 0 | Refills: 0 | DISCHARGE
Start: 2023-06-15

## 2023-06-15 RX ORDER — URSODIOL 250 MG/1
1 TABLET, FILM COATED ORAL
Qty: 60 | Refills: 0
Start: 2023-06-15 | End: 2023-07-14

## 2023-06-15 RX ADMIN — METHADONE HYDROCHLORIDE 10 MILLIGRAM(S): 40 TABLET ORAL at 05:40

## 2023-06-15 RX ADMIN — GABAPENTIN 600 MILLIGRAM(S): 400 CAPSULE ORAL at 13:08

## 2023-06-15 RX ADMIN — SIMETHICONE 80 MILLIGRAM(S): 80 TABLET, CHEWABLE ORAL at 05:41

## 2023-06-15 RX ADMIN — PANTOPRAZOLE SODIUM 40 MILLIGRAM(S): 20 TABLET, DELAYED RELEASE ORAL at 05:41

## 2023-06-15 RX ADMIN — URSODIOL 250 MILLIGRAM(S): 250 TABLET, FILM COATED ORAL at 05:41

## 2023-06-15 RX ADMIN — SIMETHICONE 80 MILLIGRAM(S): 80 TABLET, CHEWABLE ORAL at 00:09

## 2023-06-15 RX ADMIN — METHADONE HYDROCHLORIDE 10 MILLIGRAM(S): 40 TABLET ORAL at 17:15

## 2023-06-15 RX ADMIN — CELECOXIB 200 MILLIGRAM(S): 200 CAPSULE ORAL at 11:22

## 2023-06-15 RX ADMIN — SIMETHICONE 80 MILLIGRAM(S): 80 TABLET, CHEWABLE ORAL at 17:15

## 2023-06-15 RX ADMIN — URSODIOL 250 MILLIGRAM(S): 250 TABLET, FILM COATED ORAL at 17:15

## 2023-06-15 RX ADMIN — GABAPENTIN 600 MILLIGRAM(S): 400 CAPSULE ORAL at 05:41

## 2023-06-15 RX ADMIN — SIMETHICONE 80 MILLIGRAM(S): 80 TABLET, CHEWABLE ORAL at 11:23

## 2023-06-15 NOTE — DISCHARGE NOTE PROVIDER - NSDCCPCAREPLAN_GEN_ALL_CORE_FT
PRINCIPAL DISCHARGE DIAGNOSIS  Diagnosis: Transaminitis  Assessment and Plan of Treatment: Your elevated liver function test might be due to the Bactrim that you took previously, now you have improvement in these tests and you are not having any worrisome symptoms, you are good to be discharged back to the nursing home.

## 2023-06-15 NOTE — DIETITIAN INITIAL EVALUATION ADULT - NSFNSGIIOFT_GEN_A_CORE
^above weight documented on 6/13; noted weight of 74 kg documented today as actual bedscale weight. Pt reports  lbs however states she's lost ~7-8 lbs due to recent decrease in intake and fevers however she is unsure of timeframe of weight loss. CBW is not consistent with pt's reported UBW.     Past documented weights reviewed in EMR: most recent prior weight 65.8 kg from 10/17/2019 which appears more consistent with pt's reported UBW.     IBW: 54.5 kg (120 lbs)

## 2023-06-15 NOTE — DIETITIAN INITIAL EVALUATION ADULT - OTHER INFO
Pertinent Medical Information: 52-year-old female with past medical history of fibromyalgia, Guillain-Barré syndrome in October (patient paralyzed now bedbound had to be intubated - just had trach removed this week) presenting from [facility] for evaluation of elevated liver enzymes; Noted likely drug induced liver injury due to recent abx use.

## 2023-06-15 NOTE — PROGRESS NOTE ADULT - ATTENDING COMMENTS
52 y o  F with GBS seen for elevated liver tests. Developed GBS October and at rehab. Developed fever treated for pneumonia and found to have Stenotrophomonas infection and has been on Bactrim.  Reports 2 bout of R sided abdominal over a 3 day period lasting a day each 3 weeks ago. Not related to food. Pain worse lying on sides.   Bactrim has been stopped.   Alert oriented x 3  No icterus  Abdomen soft non tender  WBC plt normal mild normocytic anemia  ALP 700s improved to 500s T bili normal iron studies normal.   CT -?  hydrops GB US cholelithiasis   Cholestatic hepatitis likely due to DILI from Bactrim. Patient reports RUQ pain 3 weeks which may be more musculoskeletal   Continue ursodiol 5 - 10 mg / kg per day  Monitor liver tests and INR . MRCP if pain recurs. Would consider HIDA scan  OP follow up

## 2023-06-15 NOTE — DISCHARGE NOTE NURSING/CASE MANAGEMENT/SOCIAL WORK - PATIENT PORTAL LINK FT
You can access the FollowMyHealth Patient Portal offered by [facility] by registering at the following website: [email or web address]. By joining [employer]’s FollowMyHealth portal, you will also be able to view your health information using other applications (apps) compatible with our system.

## 2023-06-15 NOTE — DIETITIAN INITIAL EVALUATION ADULT - PERTINENT MEDS FT
MEDICATIONS  (STANDING):  celecoxib 200 milliGRAM(s) Oral daily  gabapentin 600 milliGRAM(s) Oral three times a day  methadone    Tablet 10 milliGRAM(s) Oral two times a day  pantoprazole    Tablet 40 milliGRAM(s) Oral before breakfast  simethicone 80 milliGRAM(s) Chew four times a day  ursodiol Tablet 250 milliGRAM(s) Oral every 12 hours    MEDICATIONS  (PRN):  acetaminophen     Tablet .. 650 milliGRAM(s) Oral every 6 hours PRN Temp greater or equal to 38C (100.4F), Mild Pain (1 - 3)  aluminum hydroxide/magnesium hydroxide/simethicone Suspension 30 milliLiter(s) Oral every 4 hours PRN Dyspepsia  cyclobenzaprine 5 milliGRAM(s) Oral three times a day PRN Muscle Spasm  melatonin 3 milliGRAM(s) Oral at bedtime PRN Insomnia  ondansetron Injectable 4 milliGRAM(s) IV Push every 8 hours PRN Nausea and/or Vomiting  oxycodone    5 mG/acetaminophen 325 mG 1 Tablet(s) Oral every 6 hours PRN Moderate Pain (4 - 6)  polyethylene glycol 3350 17 Gram(s) Oral daily PRN Constipation  pseudoephedrine 30 milliGRAM(s) Oral every 6 hours PRN N

## 2023-06-15 NOTE — DISCHARGE NOTE PROVIDER - ATTENDING DISCHARGE PHYSICAL EXAMINATION:
T(C): 36.3 (06-15-23 @ 07:55), Max: 36.8 (06-15-23 @ 05:02)  HR: 57 (06-15-23 @ 07:55) (54 - 68)  BP: 105/51 (06-15-23 @ 07:55) (105/51 - 112/64)  RR: 18 (06-15-23 @ 07:55) (18 - 18)  SpO2: 98% (06-15-23 @ 07:55) (97% - 98%)    CONSTITUTIONAL: Well groomed, no apparent distress  EYES: PERRLA and symmetric, EOMI, No conjunctival or scleral injection, non-icteric  ENMT: Oral mucosa with moist membranes. Normal dentition; no pharyngeal injection or exudates             NECK: Supple, symmetric and without tracheal deviation   RESP: No respiratory distress, no use of accessory muscles; CTA b/l, no WRR  CV: RRR, +S1S2, no MRG; no JVD; no peripheral edema  GI: Soft, NT, ND, no rebound, no guarding; no palpable masses; no hepatosplenomegaly; no hernia palpated  MSK: Abnormal gait; No digital clubbing or cyanosis  SKIN: No rashes or ulcers noted; no subcutaneous nodules or induration palpable  NEURO: Guillian Tifton neuropathy in all extremities  PSYCH: Appropriate insight/judgment; A+O x 3, mood and affect appropriate, recent/remote memory intact

## 2023-06-15 NOTE — DIETITIAN INITIAL EVALUATION ADULT - NS FNS DIET ORDER
Diet, Soft and Bite Sized (06-14-23 @ 11:34)    %PO Intake: pt reports consuming ~100% of scrambled eggs and home fries, coffee, juice, 75% of Qatari toast, and saving yogurt at bedside to eat.

## 2023-06-15 NOTE — DIETITIAN INITIAL EVALUATION ADULT - ORAL INTAKE PTA/DIET HISTORY
Pt reports no longer receiving EN via PEG for >/=1 month now. Reports following regular diet PTA with usual good appetite/ PO intake; consuming 3x meals per day. States that at one point she had not been eating much due to feeling unwell with fevers. PO intake currently returned to baseline. Denies Amish or cultural food preferences. Denies allergies to food or intolerances to food. Unable to recall oral nutrition supplement use. Denies difficulty chewing or swallowing.

## 2023-06-15 NOTE — PROGRESS NOTE ADULT - SUBJECTIVE AND OBJECTIVE BOX
Gastroenterology progress note:     Patient is a 52y old  Female who presents with a chief complaint of Cholecystitis (15 Prashant 2023 11:35)    Admitted on: 06-13-23    We are following the patient for elevated liver enzymes   denies abdominal pain no overnight events     PAST MEDICAL & SURGICAL HISTORY:  Cervical disc disorder with radiculopathy      Polyneuropathic pain      Chronic leg pain      Fibroid      Fibromyalgia      No significant past surgical history          MEDICATIONS  (STANDING):  celecoxib 200 milliGRAM(s) Oral daily  gabapentin 600 milliGRAM(s) Oral three times a day  methadone    Tablet 10 milliGRAM(s) Oral two times a day  pantoprazole    Tablet 40 milliGRAM(s) Oral before breakfast  simethicone 80 milliGRAM(s) Chew four times a day  ursodiol Tablet 250 milliGRAM(s) Oral every 12 hours    MEDICATIONS  (PRN):  acetaminophen     Tablet .. 650 milliGRAM(s) Oral every 6 hours PRN Temp greater or equal to 38C (100.4F), Mild Pain (1 - 3)  aluminum hydroxide/magnesium hydroxide/simethicone Suspension 30 milliLiter(s) Oral every 4 hours PRN Dyspepsia  cyclobenzaprine 5 milliGRAM(s) Oral three times a day PRN Muscle Spasm  melatonin 3 milliGRAM(s) Oral at bedtime PRN Insomnia  ondansetron Injectable 4 milliGRAM(s) IV Push every 8 hours PRN Nausea and/or Vomiting  oxycodone    5 mG/acetaminophen 325 mG 1 Tablet(s) Oral every 6 hours PRN Moderate Pain (4 - 6)  polyethylene glycol 3350 17 Gram(s) Oral daily PRN Constipation  pseudoephedrine 30 milliGRAM(s) Oral every 6 hours PRN N      Allergies  vancomycin (Rash)  No Known Allergies      Review of Systems:   Cardiovascular:  No Chest Pain, No Palpitations  Respiratory:  No Cough, No Dyspnea  Gastrointestinal:  As described in HPI  Skin:  No Skin Lesions, No Jaundice  Neuro:  No Syncope, No Dizziness    Physical Examination:  T(C): 36.3 (06-15-23 @ 07:55), Max: 36.8 (06-15-23 @ 05:02)  HR: 57 (06-15-23 @ 07:55) (54 - 71)  BP: 105/51 (06-15-23 @ 07:55) (105/51 - 112/64)  RR: 18 (06-15-23 @ 07:55) (18 - 18)  SpO2: 98% (06-15-23 @ 07:55) (97% - 98%)      06-14-23 @ 07:01  -  06-15-23 @ 07:00  --------------------------------------------------------  IN: 300 mL / OUT: 0 mL / NET: 300 mL    06-15-23 @ 07:01  -  06-15-23 @ 12:10  --------------------------------------------------------  IN: 350 mL / OUT: 0 mL / NET: 350 mL        GENERAL: AAOx3, no acute distress.  HEAD:  Atraumatic, Normocephalic  EYES: conjunctiva and sclera clear  NECK: Supple, no JVD or thyromegaly  CHEST/LUNG: Clear to auscultation bilaterally; No wheeze, rhonchi, or rales  HEART: Regular rate and rhythm; normal S1, S2, No murmurs.  ABDOMEN: Soft, nontender, nondistended; Bowel sounds present  NEUROLOGY: No asterixis or tremor.   SKIN: Intact, no jaundice     Data:                        11.0   6.75  )-----------( 288      ( 15 Prashant 2023 05:35 )             34.8     Hgb trend:  11.0  06-15-23 @ 05:35  10.3  06-14-23 @ 06:21  11.9  06-13-23 @ 15:49        06-15    143  |  106  |  11  ----------------------------<  83  4.3   |  24  |  <0.5<L>    Ca    9.6      15 Prashant 2023 05:35  Phos  4.9     06-15  Mg     1.9     06-15    TPro  5.8<L>  /  Alb  3.5  /  TBili  0.2  /  DBili  <0.2  /  AST  39  /  ALT  132<H>  /  AlkPhos  590<H>  06-15    Liver panel trend:  TBili 0.2   /   AST 39   /      /   AlkP 590   /   Tptn 5.8   /   Alb 3.5    /   DBili <0.2      06-15  TBili 0.2   /   AST 80   /      /   AlkP 708   /   Tptn 5.9   /   Alb 3.4    /   DBili <0.2      06-14  TBili 0.4   /      /      /   AlkP 794   /   Tptn 6.6   /   Alb 3.9    /   DBili 0.2      06-13      PT/INR - ( 15 Prashant 2023 05:35 )   PT: 10.80 sec;   INR: 0.95 ratio                Radiology:    US Abdomen Upper Quadrant Right:   ACC: [id number] EXAM:  US ABDOMEN RT UPR QUADRANT   ORDERED BY: ESTEPHANIA MARTINEZ     PROCEDURE DATE:  06/13/2023          INTERPRETATION:  CLINICAL INFORMATION: Pain    COMPARISON: 3/28/2016    TECHNIQUE: Sonography of the right upper quadrant..    FINDINGS:  Liver: Unremarkable..  Bile ducts: Normal caliber. Common bile duct measures 6 mm..  Gallbladder: Sludge and stones are present. There is no pericholecystic   fluid or sonographic Scott sign.  Pancreas: Visualized portions are within normal limits..  Right kidney: 11.0 cm. No hydronephrosis..  Ascites: None.  IVC: Visualized portions are within normal limits.    IMPRESSION:    Sludge and stones are present within the gallbladder    --- End of Report ---            AVELINA SARMIENTO MD; Attending Radiologist  This document has been electronically signed. Jun 13 2023  4:57PM (06-13-23 @ 16:46)    
      Patient is a 52y old  Female who presents with a chief complaint of Cholecystitis (14 Jun 2023 12:43)      SUBJECTIVE / OVERNIGHT EVENTS: Pt is asymptomatic without any abdominal pain. Daughter bedside showed me recent abx use which likely contributed to elevated LFTs as well as pt did state she had RUQ pain that lasted a day before spontaneously resolving so also could have had biliary colic vs passing a gallstone. Given the lack of elevated bilirubin and downtrending LFTs, recommend monitoring for abdominal pain, n/v, jaundice that would actually warrant further workup. Agreed on a plan to trend LFTs tomorrow am and then discharge. Has neuropathy in all extremities due to GBS  ADDITIONAL REVIEW OF SYSTEMS: as above    MEDICATIONS  (STANDING):  celecoxib 200 milliGRAM(s) Oral daily  gabapentin 600 milliGRAM(s) Oral three times a day  methadone    Tablet 10 milliGRAM(s) Oral two times a day  pantoprazole    Tablet 40 milliGRAM(s) Oral before breakfast  simethicone 80 milliGRAM(s) Chew four times a day  ursodiol Tablet 250 milliGRAM(s) Oral every 12 hours    MEDICATIONS  (PRN):  acetaminophen     Tablet .. 650 milliGRAM(s) Oral every 6 hours PRN Temp greater or equal to 38C (100.4F), Mild Pain (1 - 3)  aluminum hydroxide/magnesium hydroxide/simethicone Suspension 30 milliLiter(s) Oral every 4 hours PRN Dyspepsia  cyclobenzaprine 5 milliGRAM(s) Oral three times a day PRN Muscle Spasm  melatonin 3 milliGRAM(s) Oral at bedtime PRN Insomnia  ondansetron Injectable 4 milliGRAM(s) IV Push every 8 hours PRN Nausea and/or Vomiting  oxycodone    5 mG/acetaminophen 325 mG 1 Tablet(s) Oral every 6 hours PRN Moderate Pain (4 - 6)  polyethylene glycol 3350 17 Gram(s) Oral daily PRN Constipation  pseudoephedrine 30 milliGRAM(s) Oral every 6 hours PRN N      CAPILLARY BLOOD GLUCOSE        I&O's Summary    14 Jun 2023 07:01  -  14 Jun 2023 19:34  --------------------------------------------------------  IN: 300 mL / OUT: 0 mL / NET: 300 mL        PHYSICAL EXAM:  Vital Signs Last 24 Hrs  T(C): 36.4 (14 Jun 2023 12:27), Max: 36.5 (14 Jun 2023 00:41)  T(F): 97.6 (14 Jun 2023 12:27), Max: 97.7 (14 Jun 2023 00:41)  HR: 71 (14 Jun 2023 12:27) (65 - 72)  BP: 107/62 (14 Jun 2023 12:27) (107/62 - 134/80)  BP(mean): --  RR: 18 (14 Jun 2023 12:27) (18 - 18)  SpO2: 97% (14 Jun 2023 12:27) (97% - 100%)      CONSTITUTIONAL: NAD, well-developed, well-groomed  EYES: PERRLA; conjunctiva and sclera clear  ENMT: Moist oral mucosa, no pharyngeal injection or exudates;   NECK: Supple, no palpable masses; no thyromegaly  RESPIRATORY: Normal respiratory effort; lungs are clear to auscultation bilaterally  CARDIOVASCULAR: Regular rate and rhythm, normal S1 and S2, no murmur/rub/gallop; No lower extremity edema; Peripheral pulses are 2+ bilaterally  ABDOMEN: Nontender to palpation, normoactive bowel sounds, no rebound/guarding; No hepatosplenomegaly  MUSCULOSKELETAL:  no clubbing or cyanosis of digits; no joint swelling or tenderness to palpation  PSYCH: A+O to person, place, and time; affect appropriate  SKIN: No rashes; no palpable lesions    LABS:                        10.3   7.33  )-----------( 324      ( 14 Jun 2023 06:21 )             32.4     06-14    141  |  103  |  9<L>  ----------------------------<  91  4.3   |  24  |  <0.5<L>    Ca    9.3      14 Jun 2023 06:21  Mg     1.8     06-14    TPro  5.9<L>  /  Alb  3.4<L>  /  TBili  0.2  /  DBili  <0.2  /  AST  80<H>  /  ALT  191<H>  /  AlkPhos  708<H>  06-14                RADIOLOGY & ADDITIONAL TESTS:  Results Reviewed:   Imaging Personally Reviewed:  Electrocardiogram Personally Reviewed:    COORDINATION OF CARE:  Care Discussed with Consultants/Other Providers [Y/N]:  Prior or Outpatient Records Reviewed [Y/N]:

## 2023-06-15 NOTE — DIETITIAN INITIAL EVALUATION ADULT - PERTINENT LABORATORY DATA
06-15    143  |  106  |  11  ----------------------------<  83  4.3   |  24  |  <0.5<L>    Ca    9.6      15 Prashant 2023 05:35  Phos  4.9     06-15  Mg     1.9     06-15    TPro  5.8<L>  /  Alb  3.5  /  TBili  0.2  /  DBili  <0.2  /  AST  39  /  ALT  132<H>  /  AlkPhos  590<H>  06-15

## 2023-06-15 NOTE — PROVIDER CONTACT NOTE (OTHER) - ACTION/TREATMENT ORDERED:
provider stated to check FS q8 hours, provider to place order
Md diaz
Provider to adjust order to BG monitoring frequency he wants and neuro checks q4 hours

## 2023-06-15 NOTE — PROVIDER CONTACT NOTE (OTHER) - SITUATION
I wanted to follow up to see how often BG should be checked?
lab came to draw 11 am labs, pt in receiving blood so the lab will reschedule to the next blood draw
I see patient has BG monitoring orders for q4 hours, pt has no hx of DM and BG has not been being monitored pt is not NPO, do you want FS checked q4 hrs? also pt has orders for neuro checks q2 hours

## 2023-06-15 NOTE — DISCHARGE NOTE NURSING/CASE MANAGEMENT/SOCIAL WORK - NSDCPEFALRISK_GEN_ALL_CORE
For information on Fall & Injury Prevention, visit: [email or web address] OR  [email or web address] OR  https://www.cdc.gov/steadi/patient.html

## 2023-06-15 NOTE — DISCHARGE NOTE PROVIDER - NSDCMRMEDTOKEN_GEN_ALL_CORE_FT
Benadryl 25 mg oral capsule: 2 cap(s) orally every 6 hours  celecoxib 200 mg oral capsule: 1 cap(s) orally once a day  Claritin-D 24 Hour oral tablet, extended release: 1 tab(s) orally once a day   dextroamphetamine 10 mg oral capsule, extended release: 1 cap(s) orally once a day (in the morning)  Flonase 50 mcg/inh nasal spray: 1 spray(s) intranasally once a day   gabapentin 600 mg oral tablet: 1 tab(s) orally 3 times a day   mg oral tablet: 1 tab(s) orally every 8 hours, As Needed - for moderate pain  Lorzone:  orally   methadone:   MS Contin 60 mg/12 hours oral tablet, extended release:  orally 3 times a day  nicotine 14 mg/24 hr transdermal film, extended release:  transdermal   oxyCODONE 30 mg oral tablet, extended release: 1 tab(s) orally every 12 hours  predniSONE 10 mg oral tablet: 1 tab(s) orally once a day MDD:take on day 10-14  predniSONE 20 mg oral tablet: 1 tab(s) orally once a day MDD:day 6-9  predniSONE 5 mg oral tablet: 1 tab(s) orally once a day MDD:take on day 15-18  predniSONE 50 mg oral tablet: 1 tab(s) orally once a day  ProAir HFA 90 mcg/inh inhalation aerosol: 2 puff(s) inhaled every 4 hours, As Needed - for bronchospasm  Protonix 40 mg oral delayed release tablet: 1 tab(s) orally once a day  ursodiol 250 mg oral tablet: 1 tab(s) orally every 12 hours   Benadryl 25 mg oral capsule: 2 cap(s) orally every 6 hours  celecoxib 200 mg oral capsule: 1 cap(s) orally once a day  Claritin-D 24 Hour oral tablet, extended release: 1 tab(s) orally once a day   dextroamphetamine 10 mg oral capsule, extended release: 1 cap(s) orally once a day (in the morning)  Flonase 50 mcg/inh nasal spray: 1 spray(s) intranasally once a day   gabapentin 600 mg oral tablet: 1 tab(s) orally 3 times a day  Lorzone:  orally   methadone:   MS Contin 60 mg/12 hours oral tablet, extended release:  orally 3 times a day  nicotine 14 mg/24 hr transdermal film, extended release:  transdermal   ondansetron 4 mg oral tablet: 1 tab(s) orally every 8 hours  oxyCODONE 30 mg oral tablet, extended release: 1 tab(s) orally every 12 hours  ProAir HFA 90 mcg/inh inhalation aerosol: 2 puff(s) inhaled every 4 hours, As Needed - for bronchospasm  Protonix 40 mg oral delayed release tablet: 1 tab(s) orally once a day  ursodiol 250 mg oral tablet: 1 tab(s) orally every 12 hours

## 2023-06-15 NOTE — DISCHARGE NOTE PROVIDER - CARE PROVIDER_API CALL
Alena [address], Admin - [address]  [place] [address]  Phone: [phone]  Fax: [phone]  Follow Up Time: Routine

## 2023-06-15 NOTE — PROGRESS NOTE ADULT - ASSESSMENT
52-year-old female with past medical history of fibromyalgia, Guillain-Barré syndrome in October (patient paralyzed now bedbound had to be intubated - just had trach removed this week) presenting from [facility] for evaluation of elevated liver enzymes.     # Acute liver injury Likely DILI  - Patient received multiple Antibiotics recently  - Started on Bactrim for Stenotrophomonas. Received cefepime and Meropenem before.   - CT and RUQUS noted   - LIVER FUNCTIONS - ( 14 Jun 2023 06:21 )  Alb: 3.4 g/dL / Pro: 5.9 g/dL / ALK PHOS: 708 U/L / ALT: 191 U/L / AST: 80 U/L / GGT: x           PLAN:   Trend LFTs  Daily INR   Can continue Bactrim for now   Ursodiol 250 BID   Avoid Hepatotoxic agents   Consider HIDA scan to r/o Cholecystitis   Will follow 
52-year-old female with past medical history of fibromyalgia, Guillain-Barré syndrome in October (patient paralyzed now bedbound had to be intubated - just had trach removed this week) presenting from [facility] for evaluation of elevated liver enzymes.        #elevated LFTs, hepatocellular pattern  #likely 2/2 to DILI due to recent abx use  - no abdominal pain  - ALP/AST/ALT downtrending  - USG  Sludge and stones are present. There is no pericholecystic fluid or sonographic Scott sign  - will trend LFTs and INR  - hepatology recs appreciated  - started on ursodiol bid  - plan to discharge back to NH tomorrow after am labs      # Hx of GBS  - on steroids and pain medications  - Morphine till 6/16/23    # Hx of Fibromyalgia  - On PRN pain medications    Diet: DASH  Code: full  GI PPx: Protonix  activity as tolerated  PT consult

## 2023-06-15 NOTE — DISCHARGE NOTE PROVIDER - HOSPITAL COURSE
53 yo F with hx of Fibromyalgia, GBS (bedbound), recent Bactrim use for a UTI presents to ED from NH for elevated LFT.     The transaminitis is probably due to Bactrim use, as workup came back negative except for a past EBV infection.    All other medications were continued in the hospital, and patient is safe to be discharged. 53 yo F with hx of Fibromyalgia, GBS (bedbound), recent Bactrim use for a UTI presents to ED from NH for elevated LFT.     The transaminitis is probably due to Bactrim use, as workup came back negative except for a past EBV infection.  CT scan showed questionable appearance of acute cholecystitis, but due to benign physical examination, cholecystitis was less likely.  US did not reveal it either, but showed gallstones and the patient will be discharged on ursodiol.     All other medications were continued in the hospital, and patient is safe to be discharged. 51 yo F with hx of Fibromyalgia, GBS (bedbound), recent Bactrim use for a UTI presents to ED from NH for elevated LFTs, hepatocellular pattern. Bilirubin is normal. No abdominal pain and tolerating a diet.     Elevated LFTs are already downtrending, no elevation in bilirubin shows no obstruction. US and CT showed GB wall thickening and cholelithiasis. Given that she is a high risk patient in the setting of her GBS, recent intubation and intubation--->trach, trach removal, she would be a poor surgical candidate and would like to avoid it unless biliary colic is persistent or obstruction is noted with a high bilirubin. Even then it would be a percutaneous CCY. Spoke to GI who agreed with plan as well as the pt and daughter. Recommended ursodiol with follow up. LFTs are downtrending.       - pt has a chronic EBV infection. Sometimes they can cause elevated LFTs and bilirubin. No serological indication of an acute process so unlikely causing acute LFT elevation.    All other chronic medications can continue unchanged.

## 2023-06-15 NOTE — DIETITIAN INITIAL EVALUATION ADULT - OTHER CALCULATIONS
Estimated Energy Needs: 8183-2618 kcal/day (25-30 kcal/kg)   Estimated Protein Needs: 55-65 gm/day (1.0-1.2 gm/kg)   Estimated Fluid Needs: 3761-5589 mL/day (1mL/kcal)   Needs based on IBW 54.5 kg with consideration for age, weight, BMI

## 2023-06-27 NOTE — CDI QUERY NOTE - NSCDIOTHERTXTBX_GEN_ALL_CORE_HH
DOCUMENTATION CLARIFICATION FORM   Encounter #: [id number]                                                 Patient’s Name: Mica Barfield  Medical Record # [id number]                                              Admit Date: 2023  : 1970                                                                Discharged: 6-  CDI Specialist/: Lizette                                             Contact #: [phone]    Dear Dr. Corona,                        Date: 2023                   The Physician’s or Provider’s documentation of the patient’s presentation, evaluation and  medical management, as identified below, may support a diagnosis that is not documented in the medical record.  In order to accurately capture all diagnoses to the greatest degree of specificity reflecting the patient’s actual severity of illness, the documentation in this patient’s medical record requires additional clarification.  Please include more specific documentation, either known or suspected, of a corresponding diagnosis associated with the clinical information described below in your Progress Note and/or Discharge Summary.    QUERY  Based on your professional judgment and the clinical indicators, please clarify if bedbound, complete immobility, and dependence for all ADLs can be further specified as:  •	Bedbound, complete immobility, and dependence for all ADLs associated with functional quadriplegia  •	 Other (please specify):  •	Unable to determine    CLINICAL INDICATORS  •	6-14… H&P…52-year-old female with past medical history of fibromyalgia, Guillain-Barré syndrome in October (patient paralyzed now bedbound had to be intubated - just had trach removed this week) presenting from nursing home for evaluation of elevated liver enzymes.    •	 through 6-15kolli Nurse Flowsheet: Adult assessment and Intervention...Musculoskeletal: Significantly impaired... Nelson Assessment: 15 to 16 Activity: Bedfast (3) Completely immobile; (2) Very Limited Daily activity: Total Assist with ADLs      Documentation clarification is required for compliance, accuracy in coding and billing, and reporting severity of illness, quality data   and risk of mortality.  --------------------------------------------------------------------------------------------------------------------------------------------  DO NOT REMOVE THIS RECORD WITHOUT FIRST NOTIFYING THE CDI SPECIALIST  This form is NOT a part of the permanent Medical Record.

## 2023-06-27 NOTE — CDI QUERY NOTE - NSCDIOTHERTXTBX3_GEN_ALL_CORE_FT
DOCUMENTATION CLARIFICATION FORM   Encounter #: [id number]                                                 Patient’s Name: Mica Barfield  Medical Record # [id number]                                              Admit Date: 2023  : 1970                                                                Discharged: 6-  CDI Specialist/: Lizette                                             Contact #: [phone]    Dear Dr. Corona,                       Date: 2023                The Physician’s or Provider’s documentation of the patient’s presentation, evaluation and  medical management, as identified below, may support a diagnosis that is not documented in the medical record.  In order to accurately capture all diagnoses to the greatest degree of specificity reflecting the patient’s actual severity of illness, the documentation in this patient’s medical record requires additional clarification.  Please include more specific documentation, either known or suspected, of a corresponding diagnosis associated with the clinical information described below in your Progress Note and/or Discharge Summary.    QUERY  Based on your professional judgment and the above clinical indicators, please clarify if the imaging report for hydropic gallbladder can be further specified as:  •	Clinically significant finding of hydropic gallbladder on imaging report  •	Clinically insignificant finding of hydropic gallbladder on imaging report  •	Other (please specify) ____________________________  •	Clinically unable to determine    CLINICAL INDICATORS  Documentation  •	… H&P… evaluation of elevated liver enzymes…was being treated for culture positive from trach site for Stenotrophomonas, she was being treated for UTI and Pneumonia before trach culture turned positive…Transaminitis…RUQ shows Common bile duct measures 6 mm. Sludge and stones are present. There is no pericholecystic fluid or sonographic Scott sign.  •	 Hepatology Consult… Acute liver injury Likely DILI… received multiple Antibiotics recently…Started on Bactrim for Stenotrophomonas. Received cefepime and Meropenem before…Has cholestatic liver injury due to DILI likely Bactrim  •	6-15 Hepatology…Bactrim has been stopped….ALP 700s improved to 500s T bili normal iron studies normal. ..CT -?  hydrops GB US cholelithiasis…Cholestatic hepatitis likely due to DILI from Bactrim….Continue ursodiol 5 - 10 mg / kg per day…Monitor liver tests and INR . MRCP if pain recurs. Would consider HIDA scan  •	6-15 DC Summary…a poor surgical candidate and would like to avoid it unless biliary colic is persistent or obstruction is noted with a high bilirubin. Even then it would be a percutaneous CCY…has a chronic EBV infection. Sometimes they can cause elevated LFTs and bilirubin. No serological indication of an acute process so unlikely causing acute LFT elevation    Imaging  •	6-14 CT Ab/Pelvis Radiology Impression…Hydropic gallbladder with surrounding inflammatory changes suspicious for acute cholecystitis.        Documentation clarification is required for compliance, accuracy in coding and billing, and reporting severity of illness, quality data and risk of mortality.  --------------------------------------------------------------------------------------------------------------------------------------------  DO NOT REMOVE THIS RECORD WITHOUT FIRST NOTIFYING THE CDI SPECIALIS  This form is NOT a part of the permanent Medical Record.

## 2023-06-27 NOTE — CDI QUERY NOTE - NSCDIOTHERTXTBX2_GEN_ALL_CORE_FT
DOCUMENTATION CLARIFICATION FORM   Encounter #: [id number]                                                 Patient’s Name: Mica Barfield  Medical Record # [id number]                                              Admit Date: 2023  : 1970                                                                Discharged: 6-  CDI Specialist/: Lizette                                             Contact #: [phone]  Dear Dr. Corona,                         Date: 2023    The Physician’s or Provider’s documentation of the patient’s presentation, evaluation and  medical management, as identified below, may support a diagnosis that is not documented in the medical record.  In order to accurately capture all diagnoses to the greatest degree of specificity reflecting the patient’s actual severity of illness, the documentation in this patient’s medical record requires additional clarification.  Please include more specific documentation, either known or suspected, of a corresponding diagnosis associated with the clinical information described below in your Progress Note and/or Discharge Summary.    Query  Based on your professional judgment and the clinical indicators below, please clarify if DILI with transaminitis can be further specified as:  •	Transaminitis associated with acute non-viral inflammation of the liver   •	Transaminitis associated with acute non-viral hepatitis   •	Other (please specify) ___________  •	Unable to determine    CLINICAL INDICATORS  DOCUMENTATION  •	6-14… H&P…51yo F PMHx fibromyalgia, Guillain-Barré syndrome…(patient paralyzed …)presenting from nursing home for evaluation of elevated liver enzymes…was being treated for culture positive from trach site for Stenotrophomonas, she was being treated for UTI and Pneumonia before trach culture turned positive…Transaminitis…RUQ shows Common bile duct measures 6 mm. Sludge and stones are present. There is no pericholecystic fluid or sonographic Scott sign.  •	6-14 Hepatology Consult… Acute liver injury Likely DILI… received multiple Antibiotics recently…Started on Bactrim for Stenotrophomonas. Received cefepime and Meropenem before…Has cholestatic liver injury due to DILI likely Bactrim. No symptoms to suggest biliary issues.  •	6-14 Medicine…recent abx use which likely contributed to elevated LFTs as well as pt did state she had RUQ pain that lasted a day before spontaneously resolving so also could have had biliary colic vs passing a gallstone. Given the lack of elevated bilirubin and downtrending LFTs, recommend monitoring for abdominal pain, n/v, jaundice that would actually warrant further workup. Agreed on a plan to trend LFTs tomorrow am and then discharge.  •	615 Hepatology…Bactrim has been stopped….ALP 700s improved to 500s T bili normal iron studies normal. ..CT -?  hydrops GB US cholelithiasis…Cholestatic hepatitis likely due to DILI from Bactrim….Continue ursodiol 5 - 10 mg / kg per day…Monitor liver tests and INR . MRCP if pain recurs. Would consider HIDA scan  •	615 DC Summary…high risk patient in the setting of her GBS, recent intubation and intubation--->trach, trach removal, she would be a poor surgical candidate and would like to avoid it unless biliary colic is persistent or obstruction is noted with a high bilirubin. Even then it would be a percutaneous CCY…has a chronic EBV infection. Sometimes they can cause elevated LFTs and bilirubin. No serological indication of an acute process so unlikely causing acute LFT elevation…Diagnosis: Transaminitis…Diagnosis: Transaminitis…elevated liver function test might be due to the Bactrim    Labs  •	6-13 TPro  6.6/  Alb  3.9/  TBili  0.4  /  DBili  0.2  /  AST  164<H>  /  ALT  268<H>  /  AlkPhos  794<H>    •	6-14 TPro  5.9<L>  /  Alb  3.5  /  TBili  0.2  /  DBili  <0.2  /  AST  80<H>  /  ALT  191<H>  /  AlkPhos  708<H>    •	6-15 TPro  5.8<L>  /  Alb  3.4<L>  /  TBili  0.2  /  DBili  <0.2  /  AST  39/  ALT  132<H>  /  AlkPhos  590<H>        Documentation clarification is required for compliance, accuracy in coding and billing, and reporting severity of illness, quality data   and risk of mortality.  --------------------------------------------------------------------------------------------------------------------------------------------  DO NOT REMOVE THIS RECORD WITHOUT FIRST NOTIFYING THE CDI SPECIALIST  This form is NOT a part of the permanent Medical Record.

## 2023-09-19 ENCOUNTER — APPOINTMENT (OUTPATIENT)
Dept: ORTHOPEDIC SURGERY | Facility: CLINIC | Age: 53
End: 2023-09-19

## 2023-09-20 ENCOUNTER — APPOINTMENT (OUTPATIENT)
Dept: ORTHOPEDIC SURGERY | Facility: CLINIC | Age: 53
End: 2023-09-20

## 2023-10-17 ENCOUNTER — APPOINTMENT (OUTPATIENT)
Dept: ORTHOPEDIC SURGERY | Facility: CLINIC | Age: 53
End: 2023-10-17

## 2023-12-04 ENCOUNTER — NON-APPOINTMENT (OUTPATIENT)
Age: 53
End: 2023-12-04

## 2023-12-04 ENCOUNTER — APPOINTMENT (OUTPATIENT)
Dept: NEUROLOGY | Facility: CLINIC | Age: 53
End: 2023-12-04
Payer: MEDICAID

## 2023-12-04 VITALS
WEIGHT: 125 LBS | SYSTOLIC BLOOD PRESSURE: 131 MMHG | HEART RATE: 91 BPM | HEIGHT: 62 IN | DIASTOLIC BLOOD PRESSURE: 85 MMHG | BODY MASS INDEX: 23 KG/M2

## 2023-12-04 DIAGNOSIS — Z77.29 CONTACT WITH AND (SUSPECTED) EXPOSURE TO OTHER HAZARDOUS SUBSTANCES: ICD-10-CM

## 2023-12-04 DIAGNOSIS — G04.01 POSTINFECTIOUS ACUTE DISSEMINATED ENCEPHALITIS AND ENCEPHALOMYELITIS (POSTINFECTIOUS ADEM): ICD-10-CM

## 2023-12-04 DIAGNOSIS — Z87.39 PERSONAL HISTORY OF OTHER DISEASES OF THE MUSCULOSKELETAL SYSTEM AND CONNECTIVE TISSUE: ICD-10-CM

## 2023-12-04 DIAGNOSIS — G61.0 GUILLAIN-BARRE SYNDROME: ICD-10-CM

## 2023-12-04 PROCEDURE — 99204 OFFICE O/P NEW MOD 45 MIN: CPT

## 2023-12-04 RX ORDER — METHADONE HYDROCHLORIDE 10 MG/1
10 TABLET ORAL
Refills: 0 | Status: ACTIVE | COMMUNITY

## 2023-12-04 RX ORDER — ALPRAZOLAM 0.25 MG/1
0.25 TABLET ORAL
Refills: 0 | Status: ACTIVE | COMMUNITY

## 2023-12-04 RX ORDER — OXYCODONE 5 MG/1
5 TABLET ORAL
Refills: 0 | Status: ACTIVE | COMMUNITY

## 2023-12-04 RX ORDER — CYCLOBENZAPRINE HCL 5 MG
TABLET ORAL
Refills: 0 | Status: ACTIVE | COMMUNITY

## 2023-12-04 RX ORDER — URSODIOL 250 MG/1
250 TABLET ORAL
Refills: 0 | Status: ACTIVE | COMMUNITY

## 2023-12-04 RX ORDER — GABAPENTIN 300 MG/1
300 CAPSULE ORAL
Refills: 0 | Status: ACTIVE | COMMUNITY

## 2023-12-04 RX ORDER — DEXTROAMPHETAMINE SACCHARATE, AMPHETAMINE ASPARTATE, DEXTROAMPHETAMINE SULFATE, AND AMPHETAMINE SULFATE 2.5; 2.5; 2.5; 2.5 MG/1; MG/1; MG/1; MG/1
10 TABLET ORAL
Refills: 0 | Status: ACTIVE | COMMUNITY

## 2024-01-31 NOTE — DISCHARGE NOTE PROVIDER - COLLABORATE WITH
Department of Anesthesiology  Postprocedure Note    Patient: Allyson Gray  MRN: [id number]  YOB: 1957  Date of evaluation: 1/31/2024    Procedure Summary       Date: 01/31/24 Room / Location: [facility] ENDO 05 / [facility] ENDOSCOPY    Anesthesia Start: 1039 Anesthesia Stop: 1111    Procedure: COLORECTAL CANCER SCREENING, NOT HIGH RISK / BMI 34 (Lower GI Region) Diagnosis:       Personal history of colonic polyps      Encounter for screening colonoscopy      Morbid obesity (HCC)      Obesity, Class I, BMI 30-34.9      S/P laparoscopic sleeve gastrectomy      (Personal history of colonic polyps [Z86.010])      (Encounter for screening colonoscopy [Z12.11])      (Morbid obesity (HCC) [E66.01])      (Obesity, Class I, BMI 30-34.9 [E66.9])      (S/P laparoscopic sleeve gastrectomy [Z98.84])    Surgeons: Francisco Javier Grace MD Responsible Provider: Carlos A Ramos MD    Anesthesia Type: TIVA ASA Status: 3            Anesthesia Type: No value filed.    Carrie Phase I: Carrie Score: 10    Carrie Phase II: Carrie Score: 10    Anesthesia Post Evaluation    Patient location during evaluation: PACU  Patient participation: complete - patient participated  Level of consciousness: awake and alert  Airway patency: patent  Nausea & Vomiting: no nausea and no vomiting  Cardiovascular status: hemodynamically stable  Respiratory status: acceptable, nonlabored ventilation and spontaneous ventilation  Hydration status: euvolemic  Comments: BP (!) 150/68   Pulse 70   Temp 98 °F (36.7 °C) (Temporal)   Resp 18   Ht 1.575 m (5' 2\")   Wt 85.3 kg (188 lb)   SpO2 100%   BMI 34.39 kg/m²     Multimodal analgesia pain management approach  Pain management: adequate and satisfactory to patient    No notable events documented.   Resident

## 2024-04-18 NOTE — ED PROVIDER NOTE - CHPI ED SYMPTOMS NEG
[FreeTextEntry1] : 60 YOF smoker. ? prior CVA. HTN controlled. HLD. DMII. Tremor of head. Gout. No evidence of PAD.  Plan: Continue Enalapril, Metoprolol. Cont HCTZ 12.5 mg daily. Continue Rosuvastatin. Smoking cessation d/w patient. Neurology evaluation. F/u in 6 months.  Jerald Howard MD  
no fever/no chills

## 2024-05-07 NOTE — ED ADULT NURSE NOTE - NSIMPLEMENTINTERV_GEN_ALL_ED
Writer returned the patient's phone call. She is inquiring where Dr. Barrientos is moving to, as she wants to follow her. Writer informed her that Dr. Barrientos is actually moving out of state to [place]. Patient asked if writer knew the clinic Dr. Barrientos would be practicing at, but unfortunately, breanar was unable to provide this information to her. Patient inquired if there's anyone this writer or our office would recommend to her. She prefers a female physician within internal medicine. Informed patient writer could send a list of the recommended physicians to her via the portal. Patient requested for a paper copy to be mailed to her home address as well. She v/u and had no further questions at this time.      Recommendations are sent to the patient portal and mailed to her home address.    Implemented All Universal Safety Interventions:  [place] to call system. Call bell, personal items and telephone within reach. Instruct patient to call for assistance. Room bathroom lighting operational. Non-slip footwear when patient is off stretcher. Physically safe environment: no spills, clutter or unnecessary equipment. Stretcher in lowest position, wheels locked, appropriate side rails in place.

## 2024-07-18 ENCOUNTER — APPOINTMENT (OUTPATIENT)
Dept: NEUROLOGY | Facility: CLINIC | Age: 54
End: 2024-07-18

## 2025-01-13 ENCOUNTER — APPOINTMENT (OUTPATIENT)
Dept: SURGERY | Facility: CLINIC | Age: 55
End: 2025-01-13
Payer: MEDICAID

## 2025-01-13 VITALS
BODY MASS INDEX: 28.16 KG/M2 | WEIGHT: 153 LBS | TEMPERATURE: 98.1 F | HEIGHT: 62 IN | HEART RATE: 90 BPM | SYSTOLIC BLOOD PRESSURE: 123 MMHG | DIASTOLIC BLOOD PRESSURE: 77 MMHG

## 2025-01-13 DIAGNOSIS — Z87.891 PERSONAL HISTORY OF NICOTINE DEPENDENCE: ICD-10-CM

## 2025-01-13 PROCEDURE — 99203 OFFICE O/P NEW LOW 30 MIN: CPT
